# Patient Record
Sex: FEMALE | Race: WHITE | Employment: FULL TIME | RURAL
[De-identification: names, ages, dates, MRNs, and addresses within clinical notes are randomized per-mention and may not be internally consistent; named-entity substitution may affect disease eponyms.]

---

## 2017-02-20 ENCOUNTER — OFFICE VISIT (OUTPATIENT)
Dept: FAMILY MEDICINE CLINIC | Age: 47
End: 2017-02-20

## 2017-02-20 VITALS
HEART RATE: 77 BPM | TEMPERATURE: 97.7 F | HEIGHT: 69 IN | WEIGHT: 174.8 LBS | BODY MASS INDEX: 25.89 KG/M2 | RESPIRATION RATE: 18 BRPM | SYSTOLIC BLOOD PRESSURE: 113 MMHG | OXYGEN SATURATION: 99 % | DIASTOLIC BLOOD PRESSURE: 72 MMHG

## 2017-02-20 DIAGNOSIS — J02.0 STREP THROAT: Primary | ICD-10-CM

## 2017-02-20 DIAGNOSIS — R05.9 COUGH: ICD-10-CM

## 2017-02-20 DIAGNOSIS — R52 BODY ACHES: ICD-10-CM

## 2017-02-20 DIAGNOSIS — Z13.31 DEPRESSION SCREENING: ICD-10-CM

## 2017-02-20 DIAGNOSIS — J02.9 SORE THROAT: ICD-10-CM

## 2017-02-20 LAB
QUICKVUE INFLUENZA TEST: NEGATIVE
S PYO AG THROAT QL: POSITIVE
VALID INTERNAL CONTROL?: YES
VALID INTERNAL CONTROL?: YES

## 2017-02-20 RX ORDER — AZITHROMYCIN 250 MG/1
TABLET, FILM COATED ORAL
Qty: 6 TAB | Refills: 0 | Status: SHIPPED | OUTPATIENT
Start: 2017-02-20 | End: 2018-10-09 | Stop reason: ALTCHOICE

## 2017-02-20 NOTE — MR AVS SNAPSHOT
Visit Information Date & Time Provider Department Dept. Phone Encounter #  
 2/20/2017  1:00 PM Titus Wong PA-C [address] [id number] Follow-up Instructions Return if symptoms worsen or fail to improve. Upcoming Health Maintenance Date Due Pneumococcal 19-64 Medium Risk (1 of 1 - PPSV23) 4/4/1988 DTaP/Tdap/Td series (1 - Tdap) 4/4/1990 PAP AKA CERVICAL CYTOLOGY 4/4/1990 INFLUENZA AGE 9 TO ADULT 8/1/2016 Allergies as of 2/20/2017  Review Complete On: 2/20/2017 By: Titus Wong PA-C Severity Noted Reaction Type Reactions Pollo  05/05/2016    Hives Pcn [Penicillins]  05/22/2013    Unknown (comments) Rocephin [Ceftriaxone]  05/22/2013   Systemic Rash Current Immunizations  Never Reviewed No immunizations on file. Not reviewed this visit You Were Diagnosed With   
  
 Codes Comments Strep throat    -  Primary ICD-10-CM: J02.0 ICD-9-CM: 034.0 Sore throat     ICD-10-CM: J02.9 ICD-9-CM: 773 Cough     ICD-10-CM: R05 ICD-9-CM: 786.2 Body aches     ICD-10-CM: R52 ICD-9-CM: 780.96 Depression screening     ICD-10-CM: Z13.89 ICD-9-CM: V79.0 Vitals BP Pulse Temp Resp Height(growth percentile) Weight(growth percentile) 113/72 (BP 1 Location: Right arm, BP Patient Position: Sitting) 77 97.7 °F (36.5 °C) (Temporal) 18 5' 8.5\" (1.74 m) 174 lb 12.8 oz (79.3 kg) LMP SpO2 BMI OB Status Smoking Status 02/13/2017 (Approximate) 99% 26.19 kg/m2 Having regular periods Never Smoker Vitals History BMI and BSA Data Body Mass Index Body Surface Area  
 26.19 kg/m 2 1.96 m 2 Preferred Pharmacy Pharmacy Name Phone [facility] [address] [place], [address] Your Updated Medication List  
  
   
This list is accurate as of: 2/20/17  2:09 PM.  Always use your most recent med list.  
  
  
  
  
 azithromycin 250 mg tablet Commonly known as:  [place] Take 2 tablets today, then take 1 tablet daily DIMETAPP 12-HOUR PO Take  by mouth.  
  
 mupirocin calcium 2 % nasal ointment Commonly known as:  BACTROBAN NASAL  
by Both Nostrils route two (2) times a day. Prescriptions Sent to Pharmacy Refills  
 azithromycin (ZITHROMAX) 250 mg tablet 0 Sig: Take 2 tablets today, then take 1 tablet daily Class: Normal  
 Pharmacy: [facility] [address] [place] [address] [address]  #: [phone] We Performed the Following AMB POC RAPID INFLUENZA TEST [20678 CPT(R)] AMB POC RAPID STREP A [95714 CPT(R)] BEHAV ASSMT W/SCORE & DOCD/STAND INSTRUMENT [id number] CPT(R)] Follow-up Instructions Return if symptoms worsen or fail to improve. Patient Instructions Cough: Care Instructions Your Care Instructions A cough is your body's response to something that bothers your throat or airways. Many things can cause a cough. You might cough because of a cold or the flu, bronchitis, or asthma. Smoking, postnasal drip, allergies, and stomach acid that backs up into your throat also can cause coughs. A cough is a symptom, not a disease. Most coughs stop when the cause, such as a cold, goes away. You can take a few steps at home to cough less and feel better. Follow-up care is a key part of your treatment and safety. Be sure to make and go to all appointments, and call your doctor if you are having problems. It's also a good idea to know your test results and keep a list of the medicines you take. How can you care for yourself at home? · Drink lots of water and other fluids. This helps thin the mucus and soothes a dry or sore throat. Honey or lemon juice in hot water or tea may ease a dry cough. · Take cough medicine as directed by your doctor. · Prop up your head on pillows to help you breathe and ease a dry cough. · Try cough drops to soothe a dry or sore throat. Cough drops don't stop a cough. Medicine-flavored cough drops are no better than candy-flavored drops or hard candy. · Do not smoke. Avoid secondhand smoke. If you need help quitting, talk to your doctor about stop-smoking programs and medicines. These can increase your chances of quitting for good. When should you call for help? Call 911 anytime you think you may need emergency care. For example, call if: 
· You have severe trouble breathing. Call your doctor now or seek immediate medical care if: 
· You cough up blood. · You have new or worse trouble breathing. · You have a new or higher fever. · You have a new rash. Watch closely for changes in your health, and be sure to contact your doctor if: 
· You cough more deeply or more often, especially if you notice more mucus or a change in the color of your mucus. · You have new symptoms, such as a sore throat, an earache, or sinus pain. · You do not get better as expected. Where can you learn more? Go to http://tawannaOn The Net Yetvaleria.info/. Enter D279 in the search box to learn more about \"Cough: Care Instructions. \" Current as of: May 27, 2016 Content Version: 11.1 © 0539-9325 [employer]. Care instructions adapted under license by [employer] (which disclaims liability or warranty for this information). If you have questions about a medical condition or this instruction, always ask your healthcare professional. [place] [address] any warranty or liability for your use of this information. Strep Throat: Care Instructions Your Care Instructions Strep throat is a bacterial infection that causes sudden, severe sore throat and fever. Strep throat, which is caused by bacteria called streptococcus, is treated with antibiotics. Sometimes a strep test is necessary to tell if the sore throat is caused by strep bacteria. Treatment can help ease symptoms and may prevent future problems. Follow-up care is a key part of your treatment and safety. Be sure to make and go to all appointments, and call your doctor if you are having problems. It's also a good idea to know your test results and keep a list of the medicines you take. How can you care for yourself at home? · Take your antibiotics as directed. Do not stop taking them just because you feel better. You need to take the full course of antibiotics. · Strep throat can spread to others until 24 hours after you begin taking antibiotics. During this time, you should avoid contact with other people at work or home, especially infants and children. Do not sneeze or cough on others, and wash your hands often. Keep your drinking glass and eating utensils separate from those of others, and wash these items well in hot, soapy water. · Gargle with warm salt water at least once each hour to help reduce swelling and make your throat feel better. Use 1 teaspoon of salt mixed in 8 fluid ounces of warm water. · Take an over-the-counter pain medication, such as acetaminophen (Tylenol), ibuprofen (Advil, Motrin), or naproxen (Aleve). Read and follow all instructions on the label. · Try an over-the-counter anesthetic throat spray or throat lozenges, which may help relieve throat pain. · Drink plenty of fluids. Fluids may help soothe an irritated throat. Hot fluids, such as tea or soup, may help your throat feel better. · Eat soft solids and drink plenty of clear liquids. Flavored ice pops, ice cream, scrambled eggs, sherbet, and gelatin dessert (such as Jell-O) may also soothe the throat. · Get lots of rest. 
· Do not smoke, and avoid secondhand smoke. If you need help quitting, talk to your doctor about stop-smoking programs and medicines. These can increase your chances of quitting for good.  
· Use a vaporizer or humidifier to add moisture to the air in your bedroom. Follow the directions for cleaning the machine. When should you call for help? Call your doctor now or seek immediate medical care if: 
· You have a new or higher fever. · You have a fever with a stiff neck or severe headache. · You have new or worse trouble swallowing. · Your sore throat gets much worse on one side. · Your pain becomes much worse on one side of your throat. Watch closely for changes in your health, and be sure to contact your doctor if: 
· You are not getting better after 2 days (48 hours). · You do not get better as expected. Where can you learn more? Go to http://tawanna-valeria.info/. Enter K625 in the search box to learn more about \"Strep Throat: Care Instructions. \" Current as of: July 29, 2016 Content Version: 11.1 © 5031-6968 [employer]. Care instructions adapted under license by [employer] (which disclaims liability or warranty for this information). If you have questions about a medical condition or this instruction, always ask your healthcare professional. [address] any warranty or liability for your use of this information. Introducing [facility]! Pj Santos introduces [employer] patient portal. Now you can access parts of your medical record, email your doctor's office, and request medication refills online. 1. In your internet browser, go to [email or web address] 2. Click on the First Time User? Click Here link in the Sign In box. You will see the New Member Sign Up page. 3. Enter your [employer] Access Code exactly as it appears below. You will not need to use this code after youve completed the sign-up process. If you do not sign up before the expiration date, you must request a new code. · [employer] Access Code: [email or web address] Expires: 5/21/2017  2:09 PM 
 
4.  Enter the last four digits of your Social Security Number (xxxx) and Date of Birth (mm/dd/yyyy) as indicated and click Submit. You will be taken to the next sign-up page. 5. Create a ZeaKal ID. This will be your ZeaKal login ID and cannot be changed, so think of one that is secure and easy to remember. 6. Create a ZeaKal password. You can change your password at any time. 7. Enter your Password Reset Question and Answer. This can be used at a later time if you forget your password. 8. Enter your e-mail address. You will receive e-mail notification when new information is available in [address] 9. Click Sign Up. You can now view and download portions of your medical record. 10. Click the Download Summary menu link to download a portable copy of your medical information. If you have questions, please visit the Frequently Asked Questions section of the ZeaKal website. Remember, [employer]aKal is NOT to be used for urgent needs. For medical emergencies, dial 911. Now available from your iPhone and Android! Please provide this summary of care documentation to your next provider. Your primary care clinician is listed as Cris Blackburn. If you have any questions after today's visit, please call [phone].

## 2017-02-20 NOTE — PATIENT INSTRUCTIONS
Cough: Care Instructions  Your Care Instructions  A cough is your body's response to something that bothers your throat or airways. Many things can cause a cough. You might cough because of a cold or the flu, bronchitis, or asthma. Smoking, postnasal drip, allergies, and stomach acid that backs up into your throat also can cause coughs. A cough is a symptom, not a disease. Most coughs stop when the cause, such as a cold, goes away. You can take a few steps at home to cough less and feel better. Follow-up care is a key part of your treatment and safety. Be sure to make and go to all appointments, and call your doctor if you are having problems. It's also a good idea to know your test results and keep a list of the medicines you take. How can you care for yourself at home? · Drink lots of water and other fluids. This helps thin the mucus and soothes a dry or sore throat. Honey or lemon juice in hot water or tea may ease a dry cough. · Take cough medicine as directed by your doctor. · Prop up your head on pillows to help you breathe and ease a dry cough. · Try cough drops to soothe a dry or sore throat. Cough drops don't stop a cough. Medicine-flavored cough drops are no better than candy-flavored drops or hard candy. · Do not smoke. Avoid secondhand smoke. If you need help quitting, talk to your doctor about stop-smoking programs and medicines. These can increase your chances of quitting for good. When should you call for help? Call 911 anytime you think you may need emergency care. For example, call if:  · You have severe trouble breathing. Call your doctor now or seek immediate medical care if:  · You cough up blood. · You have new or worse trouble breathing. · You have a new or higher fever. · You have a new rash.   Watch closely for changes in your health, and be sure to contact your doctor if:  · You cough more deeply or more often, especially if you notice more mucus or a change in the color of your mucus. · You have new symptoms, such as a sore throat, an earache, or sinus pain. · You do not get better as expected. Where can you learn more? Go to http://tawanna-valeria.info/. Enter D279 in the search box to learn more about \"Cough: Care Instructions. \"  Current as of: May 27, 2016  Content Version: 11.1  © 2006-2016 [employer]. Care instructions adapted under license by Cole Martin (which disclaims liability or warranty for this information). If you have questions about a medical condition or this instruction, always ask your healthcare professional. [place] [address] any warranty or liability for your use of this information. Strep Throat: Care Instructions  Your Care Instructions    Strep throat is a bacterial infection that causes sudden, severe sore throat and fever. Strep throat, which is caused by bacteria called streptococcus, is treated with antibiotics. Sometimes a strep test is necessary to tell if the sore throat is caused by strep bacteria. Treatment can help ease symptoms and may prevent future problems. Follow-up care is a key part of your treatment and safety. Be sure to make and go to all appointments, and call your doctor if you are having problems. It's also a good idea to know your test results and keep a list of the medicines you take. How can you care for yourself at home? · Take your antibiotics as directed. Do not stop taking them just because you feel better. You need to take the full course of antibiotics. · Strep throat can spread to others until 24 hours after you begin taking antibiotics. During this time, you should avoid contact with other people at work or home, especially infants and children. Do not sneeze or cough on others, and wash your hands often. Keep your drinking glass and eating utensils separate from those of others, and wash these items well in hot, soapy water.   · Gargle with warm salt water at least once each hour to help reduce swelling and make your throat feel better. Use 1 teaspoon of salt mixed in 8 fluid ounces of warm water. · Take an over-the-counter pain medication, such as acetaminophen (Tylenol), ibuprofen (Advil, Motrin), or naproxen (Aleve). Read and follow all instructions on the label. · Try an over-the-counter anesthetic throat spray or throat lozenges, which may help relieve throat pain. · Drink plenty of fluids. Fluids may help soothe an irritated throat. Hot fluids, such as tea or soup, may help your throat feel better. · Eat soft solids and drink plenty of clear liquids. Flavored ice pops, ice cream, scrambled eggs, sherbet, and gelatin dessert (such as Jell-O) may also soothe the throat. · Get lots of rest.  · Do not smoke, and avoid secondhand smoke. If you need help quitting, talk to your doctor about stop-smoking programs and medicines. These can increase your chances of quitting for good. · Use a vaporizer or humidifier to add moisture to the air in your bedroom. Follow the directions for cleaning the machine. When should you call for help? Call your doctor now or seek immediate medical care if:  · You have a new or higher fever. · You have a fever with a stiff neck or severe headache. · You have new or worse trouble swallowing. · Your sore throat gets much worse on one side. · Your pain becomes much worse on one side of your throat. Watch closely for changes in your health, and be sure to contact your doctor if:  · You are not getting better after 2 days (48 hours). · You do not get better as expected. Where can you learn more? Go to http://tawanna-valeria.info/. Enter K625 in the search box to learn more about \"Strep Throat: Care Instructions. \"  Current as of: July 29, 2016  Content Version: 11.1  © 9473-9919 [employer].  Care instructions adapted under license by [employer] (which disclaims liability or warranty for this information). If you have questions about a medical condition or this instruction, always ask your healthcare professional. [place] [address] any warranty or liability for your use of this information.

## 2017-02-20 NOTE — PROGRESS NOTES
Gregg Brewer is a 51 y.o. female who presents to the office today with the following:  Chief Complaint   Patient presents with    Sore Throat     sore throat, headache, fever, cough, chills, bodyaches started last wednesday       HPI  Wed night began with dry cough, no ST, \"just tickle\"  Hoarse next morn. Subjective fever initially, highest taken 101.0F  Thur night fever felt worse, bad chills. Deep cough, still dry. Then dev sore throat, mild, no painful/difficulty swallowing. Sneezing also since yesterday. Runny nose, ears feel itchy/hurting, headache. 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 Review of Systems   Constitutional: Positive for chills, fever and malaise/fatigue. HENT: Positive for congestion, ear pain and sore throat. Respiratory: Positive for cough. Negative for shortness of breath and wheezing. Cardiovascular: Chest pain: pt notes constant soreness from coughing x 3 days, hurts to cough, no exertional sxs. Gastrointestinal: Negative for abdominal pain, diarrhea, nausea and vomiting. Genitourinary: Negative. Musculoskeletal: Positive for myalgias. Skin: Negative for rash. Neurological: Positive for headaches. Allergies   Allergen Reactions    Pollo Hives    Pcn [Penicillins] Unknown (comments)    Rocephin [Ceftriaxone] Rash       Current Outpatient Prescriptions   Medication Sig    azithromycin (ZITHROMAX) 250 mg tablet Take 2 tablets today, then take 1 tablet daily    mupirocin calcium (BACTROBAN NASAL) 2 % nasal ointment by Both Nostrils route two (2) times a day.  PSEUDOEPHEDRINE HCL (DIMETAPP 12-HOUR PO) Take  by mouth. No current facility-administered medications for this visit. Past Medical History   Diagnosis Date    Asthma     MRSA (methicillin resistant Staphylococcus aureus)        History reviewed. No pertinent past surgical history.     Social History     Social History    Marital status:      Spouse name: N/A    Number of children: N/A    Years of education: N/A     Social History Main Topics    Smoking status: Never Smoker    Smokeless tobacco: Never Used    Alcohol use No    Drug use: No    Sexual activity: Yes     Partners: Male     Birth control/ protection: None     Other Topics Concern    None     Social History Narrative       Family History   Problem Relation Age of Onset    Diabetes Paternal Grandfather          Physical Exam:  Visit Vitals    /72 (BP 1 Location: Right arm, BP Patient Position: Sitting)    Pulse 77    Temp 97.7 °F (36.5 °C) (Temporal)    Resp 18    Ht 5' 8.5\" (1.74 m)    Wt 174 lb 12.8 oz (79.3 kg)    LMP 02/13/2017 (Approximate)    SpO2 99%    BMI 26.19 kg/m2     Physical Exam   Constitutional: She is oriented to person, place, and time and well-developed, well-nourished, and in no distress. HENT:   Head: Normocephalic and atraumatic. Right Ear: Tympanic membrane, external ear and ear canal normal.   Left Ear: Tympanic membrane, external ear and ear canal normal.   Nose: Mucosal edema (mild, nares patent) present. Right sinus exhibits no maxillary sinus tenderness and no frontal sinus tenderness. Left sinus exhibits no maxillary sinus tenderness and no frontal sinus tenderness. Mouth/Throat: Uvula is midline and mucous membranes are normal. Posterior oropharyngeal erythema (mild) present. No oropharyngeal exudate, posterior oropharyngeal edema or tonsillar abscesses. Eyes: Conjunctivae are normal.   Neck: Normal range of motion. Neck supple. Cardiovascular: Normal rate, regular rhythm, normal heart sounds and intact distal pulses. Pulmonary/Chest: Effort normal and breath sounds normal. No respiratory distress. She has no wheezes. She has no rales. Abdominal: Soft. There is no tenderness. Lymphadenopathy:     She has no cervical adenopathy. Neurological: She is alert and oriented to person, place, and time. Gait normal.   Skin: Skin is warm and dry. Psychiatric: Mood and affect normal.   Nursing note and vitals reviewed. Assessment/Plan:    ICD-10-CM ICD-9-CM    1. Strep throat J02.0 034.0 azithromycin (ZITHROMAX) 250 mg tablet   2. Sore throat J02.9 462 AMB POC RAPID STREP A      azithromycin (ZITHROMAX) 250 mg tablet      AMB POC RAPID INFLUENZA TEST   3. Cough R05 786.2 AMB POC RAPID INFLUENZA TEST   4. Body aches R52 780.96 AMB POC RAPID INFLUENZA TEST   5. Depression screening Z13.89 V79.0 BEHAV ASSMT W/SCORE & DOCD/STAND INSTRUMENT     Results for orders placed or performed in visit on 02/20/17   AMB POC RAPID STREP A   Result Value Ref Range    VALID INTERNAL CONTROL POC Yes     Group A Strep Ag Positive Negative   AMB POC RAPID INFLUENZA TEST   Result Value Ref Range    VALID INTERNAL CONTROL POC Yes     QuickVue Influenza test Negative Negative     Encourage rest & fluids. Warm salt water gargles. Discussed otc medications for symptomatic relief. New tooth brush. RTO if sxs persist/worsen or develops any additional sxs/concerns. Follow-up Disposition:  Return if symptoms worsen or fail to improve.     Pierce Plunkett PA-C

## 2017-02-20 NOTE — PROGRESS NOTES
Chief Complaint   Patient presents with    Sore Throat     sore throat, headache, fever, cough, chills, bodyaches started last wednesday

## 2017-07-17 ENCOUNTER — TELEPHONE (OUTPATIENT)
Dept: FAMILY MEDICINE CLINIC | Age: 47
End: 2017-07-17

## 2017-07-17 NOTE — TELEPHONE ENCOUNTER
Patient states this message was not supposed to be sent here to [address] [place] [address]. She would like a call back from Dr. Kj Finch. Routing this message to [facility].

## 2017-07-17 NOTE — TELEPHONE ENCOUNTER
----- Message from Jerald Leiva sent at 7/17/2017 10:04 AM EDT -----  Regarding: FW: Dr. Janelle Nathan      ----- Message -----     From: Elenita Veliz     Sent: 7/17/2017   7:48 AM       To: [facility] Front Office  Subject: Dr. Janelle Nathan                             Pt stated she would like a call from the doctor to discuss some personal issues . Best contact number [phone].

## 2017-07-17 NOTE — TELEPHONE ENCOUNTER
Left message on her voice mail, asking her to return call, re: how I could help her (had question for Luis Manuel Isaac, per note in chart).

## 2019-04-15 PROBLEM — L30.9 ECZEMA: Status: ACTIVE | Noted: 2019-04-15

## 2019-04-15 PROBLEM — M13.0 POLYARTICULAR ARTHRITIS: Status: ACTIVE | Noted: 2019-04-15

## 2020-09-15 ENCOUNTER — OFFICE VISIT (OUTPATIENT)
Dept: PRIMARY CARE CLINIC | Age: 50
End: 2020-09-15

## 2020-09-15 DIAGNOSIS — Z20.828 EXPOSURE TO SARS-ASSOCIATED CORONAVIRUS: Primary | ICD-10-CM

## 2020-09-15 NOTE — LETTER
NOTIFICATION RETURN TO WORK / SCHOOL 
 
9/15/2020 8:13 AM 
 
Ms. Joy Ramospool 1970 
[address] [id number] To Whom It May Concern: Anisha Schneider is currently under the care of [facility] and was  
 
tested for Covid 19 on 9/15/2020. It is recommended that the patient self-quarantine until the Covid test  
 
results return AND until they are symptom free without medication for 48 hours. She will return to work/school on: Once test results come back If there are questions or concerns please have the patient contact our office. Sincerely, Mariya Vargas, [facility]

## 2020-09-17 LAB — SARS-COV-2, NAA: NOT DETECTED

## 2021-01-05 ENCOUNTER — OFFICE VISIT (OUTPATIENT)
Dept: PRIMARY CARE CLINIC | Age: 51
End: 2021-01-05

## 2021-01-05 DIAGNOSIS — Z20.822 ENCOUNTER FOR LABORATORY TESTING FOR COVID-19 VIRUS: Primary | ICD-10-CM

## 2021-01-08 LAB — SARS-COV-2, NAA: NOT DETECTED

## 2021-02-05 PROBLEM — M13.0 POLYARTICULAR ARTHRITIS: Status: RESOLVED | Noted: 2019-04-15 | Resolved: 2021-02-05

## 2021-05-24 ENCOUNTER — OFFICE VISIT (OUTPATIENT)
Dept: FAMILY MEDICINE CLINIC | Age: 51
End: 2021-05-24
Payer: COMMERCIAL

## 2021-05-24 VITALS
WEIGHT: 190.6 LBS | DIASTOLIC BLOOD PRESSURE: 70 MMHG | HEIGHT: 68 IN | SYSTOLIC BLOOD PRESSURE: 100 MMHG | TEMPERATURE: 98.1 F | OXYGEN SATURATION: 98 % | BODY MASS INDEX: 28.89 KG/M2 | HEART RATE: 91 BPM

## 2021-05-24 DIAGNOSIS — F41.9 ANXIETY: ICD-10-CM

## 2021-05-24 DIAGNOSIS — M62.830 BACK SPASM: Primary | ICD-10-CM

## 2021-05-24 DIAGNOSIS — B35.3 TINEA PEDIS OF BOTH FEET: ICD-10-CM

## 2021-05-24 PROCEDURE — 99213 OFFICE O/P EST LOW 20 MIN: CPT | Performed by: FAMILY MEDICINE

## 2021-05-24 RX ORDER — KETOCONAZOLE 20 MG/G
CREAM TOPICAL DAILY
Qty: 60 G | Refills: 2 | Status: SHIPPED | OUTPATIENT
Start: 2021-05-24 | End: 2022-06-03

## 2021-05-24 RX ORDER — PREDNISONE 10 MG/1
10 TABLET ORAL
Qty: 4 TABLET | Refills: 0 | Status: SHIPPED | OUTPATIENT
Start: 2021-05-24 | End: 2021-06-11 | Stop reason: ALTCHOICE

## 2021-05-24 RX ORDER — BUSPIRONE HYDROCHLORIDE 5 MG/1
5 TABLET ORAL
Qty: 30 TABLET | Refills: 11 | Status: SHIPPED | OUTPATIENT
Start: 2021-05-24 | End: 2022-06-03

## 2021-05-24 NOTE — PROGRESS NOTES
Chief Complaint   Patient presents with    Back Pain     1. Have you been to the ER, urgent care clinic since your last visit? Hospitalized since your last visit? No    2. Have you seen or consulted any other health care providers outside of the [address] [place] [address] since your last visit? Include any pap smears or colon screening. Eve Ng is a 45 y.o. female    HPI:  Symptoms include mid back pain x2mo, waxing and waning. No trauma or known overuse, although she is a . gradually worsening since that time. Evaluation to date: none. Treatment to date: rest, better with lying flat, better with sitting with one or the other hip up, better sitting with back arched, or lying flat on back. Worse with bending forward, worse with lifting. PMH, SH, Medications/Allergies: reviewed, on chart. Current Outpatient Medications   Medication Sig    tiZANidine (ZANAFLEX) 4 mg tablet 1 BID prn back spasm    betamethasone dipropionate (DIPROLENE) 0.05 % ointment APPLY SPARINGLY WITH GLOVES TO CRACKED, FISSURED HEAVY PLAQUES TWICE DAILY AS NEEDED    amitriptyline (ELAVIL) 50 mg tablet 1 tab qhs for fibromyalgia    triamcinolone acetonide (KENALOG) 0.1 % topical cream AAA to mild itchy patches and plaques BID in thin coat     No current facility-administered medications for this visit.       ROS:  Constitutional: No fever, chills or abnormal weight loss  Respiratory: No cough, SOB   CV: No chest pain or Palpitations    Visit Vitals  /70 (BP 1 Location: Right upper arm, BP Patient Position: Sitting, BP Cuff Size: Adult)   Pulse 91   Temp 98.1 °F (36.7 °C) (Oral)   Ht 5' 8\" (1.727 m)   Wt 190 lb 9.6 oz (86.5 kg)   SpO2 98%   BMI 28.98 kg/m²     Wt Readings from Last 3 Encounters:   05/24/21 190 lb 9.6 oz (86.5 kg)   07/03/19 170 lb (77.1 kg)   05/13/19 175 lb (79.4 kg)     BP Readings from Last 3 Encounters:   05/24/21 100/70   07/03/19 110/60   05/13/19 100/60     Physical Examination: General appearance - alert, well appearing, and in no distress  Mental status - alert, oriented to person, place, and time  Eyes - pupils equal and reactive, extraocular eye movements intact  ENT - bilateral external ears and nose normal. Normal lips  Neck - supple, no significant adenopathy, no thyromegaly or mass  Lymphatics - no palpable lymphadenopathy, no hepatosplenomegaly  Chest - clear to auscultation, no wheezes, rales or rhonchi, symmetric air entry  Heart - normal rate, regular rhythm, normal S1, S2, no murmurs, rubs, clicks or gallops  Extremities - peripheral pulses normal, no pedal edema, no clubbing or cyanosis  Musculoskeletal:  Spine:  Cervical   Thoracic   Lumbar: straight. TTP to paraspinous mm. near T5-t8 Bilat. LE strength is normal. Dionisio's signs absent. A/P  Mid back strain  Did well in past with prednisone, not getting a lot of help from OTC NSAIDs. RF prednisone 10mg, 1 every day x4d    Anxiety  Mildly worsening lately. Discussed options. Consider therapy referral. PT would like to try low dose buspar first (discussed drug int with elavil). Start 5mg/d, titrate as needed. Athlete's foot  Incidentally noted.  tx with nizoral cr AAA daily x 10d    F/U PRN

## 2021-06-11 ENCOUNTER — OFFICE VISIT (OUTPATIENT)
Dept: FAMILY MEDICINE CLINIC | Age: 51
End: 2021-06-11
Payer: COMMERCIAL

## 2021-06-11 VITALS
RESPIRATION RATE: 18 BRPM | TEMPERATURE: 97.2 F | OXYGEN SATURATION: 97 % | HEART RATE: 92 BPM | DIASTOLIC BLOOD PRESSURE: 82 MMHG | WEIGHT: 191.4 LBS | SYSTOLIC BLOOD PRESSURE: 116 MMHG | HEIGHT: 68 IN | BODY MASS INDEX: 29.01 KG/M2

## 2021-06-11 DIAGNOSIS — Z23 ENCOUNTER FOR IMMUNIZATION: Primary | ICD-10-CM

## 2021-06-11 DIAGNOSIS — L03.011 PARONYCHIA OF RIGHT THUMB: ICD-10-CM

## 2021-06-11 PROCEDURE — 90471 IMMUNIZATION ADMIN: CPT | Performed by: INTERNAL MEDICINE

## 2021-06-11 PROCEDURE — 10060 I&D ABSCESS SIMPLE/SINGLE: CPT | Performed by: INTERNAL MEDICINE

## 2021-06-11 PROCEDURE — 99214 OFFICE O/P EST MOD 30 MIN: CPT | Performed by: INTERNAL MEDICINE

## 2021-06-11 PROCEDURE — 90715 TDAP VACCINE 7 YRS/> IM: CPT | Performed by: INTERNAL MEDICINE

## 2021-06-11 RX ORDER — CLINDAMYCIN HYDROCHLORIDE 300 MG/1
300 CAPSULE ORAL 3 TIMES DAILY
Qty: 30 CAPSULE | Refills: 0 | Status: SHIPPED | OUTPATIENT
Start: 2021-06-11 | End: 2021-06-21

## 2021-06-11 NOTE — PROGRESS NOTES
Brandon Rodas is a 45 y.o. female presenting for/with:    Chief Complaint   Patient presents with    Finger Swelling     C/O possible thorn to (R) thumb since Sunday (+) pain. (+) redness (+) swelling    Back Pain     Completed prednisone without relief of back pain       Visit Vitals  /82 (BP 1 Location: Left upper arm, BP Patient Position: Sitting, BP Cuff Size: Adult long)   Pulse 92   Temp 97.2 °F (36.2 °C) (Temporal)   Resp 18   Ht 5' 8\" (1.727 m)   Wt 191 lb 6.4 oz (86.8 kg)   SpO2 97%   BMI 29.10 kg/m²     Pain Scale: 8/10  Pain Location: Arm ((R) hand and back)    1. Have you been to the ER, urgent care clinic since your last visit? Hospitalized since your last visit? NO    2. Have you seen or consulted any other health care providers outside of the [address] [place] [address] since your last visit? Include any pap smears or colon screening. NO    Symptom review:  NO  Fever   NO  Shaking chills  NO  Cough  NO  Body aches  NO  Coughing up blood  NO  Chest congestion  NO  Chest pain  NO  Shortness of breath  NO  Profound Loss of smell/taste  NO  Nausea/Vomiting   NO  Loose stool/Diarrhea  YES  any skin issues    Patient Risk Factors Reviewed as follows:  NO  have you been in Close contact with confirmed COVID19 patient   NO  History of recent travel to affected geographical areas within the past 14 days  NO  COPD  NO  Active Cancer/Leukemia/Lymphoma/Chemotherapy  NO  Oral steroid use  NO  Pregnant  NO  Diabetes Mellitus  NO  Heart disease  NO  Asthma  NO Health care worker at home  [address] care worker  NO Is there a Pregnant Woman in the home  NO Dialysis pt in the home   NO a large number of people living in the home    Learning Assessment 5/24/2021   PRIMARY LEARNER Patient   HIGHEST LEVEL OF EDUCATION - PRIMARY LEARNER  -   PRIMARY LANGUAGE ENGLISH   LEARNER PREFERENCE PRIMARY READING   ANSWERED BY patient    RELATIONSHIP SELF     Fall Risk Assessment, last 12 mths 5/24/2021   Able to walk? Yes   Fall in past 12 months? 0   Do you feel unsteady? 0   Are you worried about falling 0       3 most recent PHQ Screens 5/24/2021   Little interest or pleasure in doing things Not at all   Feeling down, depressed, irritable, or hopeless Not at all   Total Score PHQ 2 0     Abuse Screening Questionnaire 5/24/2021   Do you ever feel afraid of your partner? N   Are you in a relationship with someone who physically or mentally threatens you? N   Is it safe for you to go home? Y       ADL Assessment 5/24/2021   Feeding yourself No Help Needed   Getting from bed to chair No Help Needed   Getting dressed No Help Needed   Bathing or showering No Help Needed   Walk across the room (includes cane/walker) No Help Needed   Using the telphone No Help Needed   Taking your medications No Help Needed   Preparing meals No Help Needed   Managing money (expenses/bills) No Help Needed   Moderately strenuous housework (laundry) No Help Needed   Shopping for personal items (toiletries/medicines) No Help Needed   Shopping for groceries No Help Needed   Driving No Help Needed   Climbing a flight of stairs No Help Needed   Getting to places beyond walking distances No Help Needed      After obtaining consent, and per orders of Dr. Beatriz Hadley, injection of TDAP to (R) deltoid given by Nuha Justice. Patient instructed to remain in clinic for 20 minutes afterwards, and to report any adverse reaction to me immediately.

## 2021-06-11 NOTE — PATIENT INSTRUCTIONS
Vaccine Information Statement Tdap (Tetanus, Diphtheria, Pertussis) Vaccine: What you need to know Many Vaccine Information Statements are available in Maltese and other languages. See www.immunize.org/vis Hojas de información sobre vacunas están disponibles en español y en muchos otros idiomas. Visite www.immunize.org/vis 1. Why get vaccinated? Tdap vaccine can prevent tetanus, diphtheria, and pertussis. Diphtheria and pertussis spread from person to person. Tetanus enters the body through cuts or wounds.  TETANUS (T) causes painful stiffening of the muscles. Tetanus can lead to serious health problems, including being unable to open the mouth, having trouble swallowing and breathing, or death.  DIPHTHERIA (D) can lead to difficulty breathing, heart failure, paralysis, or death.  PERTUSSIS (aP), also known as whooping cough, can cause uncontrollable, violent coughing which makes it hard to breathe, eat, or drink. Pertussis can be extremely serious in babies and young children, causing pneumonia, convulsions, brain damage, or death. In teens and adults, it can cause weight loss, loss of bladder control, passing out, and rib fractures from severe coughing. 2. Tdap vaccine Tdap is only for children 7 years and older, adolescents, and adults. Adolescents should receive a single dose of Tdap, preferably at age 6 or 15 years. Pregnant women should get a dose of Tdap during every pregnancy, to protect the  from pertussis. Infants are most at risk for severe, life-threatening complications from pertussis. Adults who have never received Tdap should get a dose of Tdap. Also, adults should receive a booster dose every 10 years, or earlier in the case of a severe and dirty wound or burn. Booster doses can be either Tdap or Td (a different vaccine that protects against tetanus and diphtheria but not pertussis).   
 
Tdap may be given at the same time as other vaccines. 3. Talk with your health care provider Tell your vaccine provider if the person getting the vaccine: 
 Has had an allergic reaction after a previous dose of any vaccine that protects against tetanus, diphtheria, or pertussis, or has any severe, life-threatening allergies.  Has had a coma, decreased level of consciousness, or prolonged seizures within 7 days after a previous dose of any pertussis vaccine (DTP, DTaP, or Tdap).  Has seizures or another nervous system problem.  Has ever had Guillain-Barré Syndrome (also called GBS).  Has had severe pain or swelling after a previous dose of any vaccine that protects against tetanus or diphtheria. In some cases, your health care provider may decide to postpone Tdap vaccination to a future visit. People with minor illnesses, such as a cold, may be vaccinated. People who are moderately or severely ill should usually wait until they recover before getting Tdap vaccine. Your health care provider can give you more information. 4. Risks of a vaccine reaction  Pain, redness, or swelling where the shot was given, mild fever, headache, feeling tired, and nausea, vomiting, diarrhea, or stomachache sometimes happen after Tdap vaccine. People sometimes faint after medical procedures, including vaccination. Tell your provider if you feel dizzy or have vision changes or ringing in the ears. As with any medicine, there is a very remote chance of a vaccine causing a severe allergic reaction, other serious injury, or death. 5. What if there is a serious problem? An allergic reaction could occur after the vaccinated person leaves the clinic. If you see signs of a severe allergic reaction (hives, swelling of the face and throat, difficulty breathing, a fast heartbeat, dizziness, or weakness), call 9-1-1 and get the person to the nearest hospital. 
 
For other signs that concern you, call your health care provider.  
 
Adverse reactions should be reported to the Vaccine Adverse Event Reporting System (VAERS). Your health care provider will usually file this report, or you can do it yourself. Visit the VAERS website at www.vaers. [facility].gov or call 2-476.175.7130. VAERS is only for reporting reactions, and VAERS staff do not give medical advice. 6. The National Vaccine Injury Compensation Program 
 
The [facility] Vaccine Injury Compensation Program (VICP) is a federal program that was created to compensate people who may have been injured by certain vaccines. Visit the VICP website at www.[facility].gov/vaccinecompensation or call 3-691.167.4886 to learn about the program and about filing a claim. There is a time limit to file a claim for compensation. 7. How can I learn more?  Ask your health care provider.  Call your local or state health department.  Contact the Centers for Disease Control and Prevention (CDC): 
- Call 6-289.856.7599 (1-800-CDC-INFO) or 
- Visit CDCs website at www.cdc.gov/vaccines Vaccine Information Statement (Interim) Tdap (Tetanus, Diphtheria, Pertussis) Vaccine 04/01/2020 
42 SHAAN Murdock [id number] Department of Health and [employer] Centers for Disease Control and Prevention Office Use Only

## 2021-06-11 NOTE — PROGRESS NOTES
Chief Complaint   Patient presents with    Finger Swelling     C/O possible thorn to (R) thumb since Sunday (+) pain. (+) redness (+) swelling    Back Pain     Completed prednisone without relief of back pain       ASSESSMENT AND PLAN:    1. Encounter for immunization  - TETANUS, DIPHTHERIA TOXOIDS AND ACELLULAR PERTUSSIS VACCINE (TDAP), IN INDIVIDS. >=7, IM    2. Paronychia of right thumb  - clindamycin (CLEOCIN) 300 mg capsule; Take 1 Capsule by mouth three (3) times daily for 10 days. Dispense: 30 Capsule; Refill: 0       Warm salt water soaks TID. Elevate       [employer] message Monday for update. She will call me if there are concerns. Orders Placed This Encounter    Tetanus, diphtheria toxoids and acellular pertussis (TDAP) vaccine, in individuals >=7 years, IM    clindamycin (CLEOCIN) 300 mg capsule     Sig: Take 1 Capsule by mouth three (3) times daily for 10 days. Dispense:  30 Capsule     Refill:  0       Monalisa Lew MD, FACP      HPI:        Jese Sanders is a 45 y.o. female who notes the onset of a skin problem. The details are as follows:  Painful tight thumb.  vvery tender. No fever. Unsure of Td status. Allergy to PCN and Rocephin. Allergies   Allergen Reactions    Pollo Hives    Pcn [Penicillins] Unknown (comments)    Rocephin [Ceftriaxone] Rash       Current Outpatient Medications   Medication Sig    clindamycin (CLEOCIN) 300 mg capsule Take 1 Capsule by mouth three (3) times daily for 10 days.  busPIRone (BUSPAR) 5 mg tablet Take 1 Tablet by mouth daily as needed (anxiety).  ketoconazole (NIZORAL) 2 % topical cream Apply  to affected area daily.     tiZANidine (ZANAFLEX) 4 mg tablet 1 BID prn back spasm    betamethasone dipropionate (DIPROLENE) 0.05 % ointment APPLY SPARINGLY WITH GLOVES TO CRACKED, FISSURED HEAVY PLAQUES TWICE DAILY AS NEEDED    amitriptyline (ELAVIL) 50 mg tablet 1 tab qhs for fibromyalgia    triamcinolone acetonide (KENALOG) 0.1 % topical cream AAA to mild itchy patches and plaques BID in thin coat     No current facility-administered medications for this visit. Past Medical History:   Diagnosis Date    Asthma     Menopause     MRSA (methicillin resistant Staphylococcus aureus)          ROS:  Denies fever, chills, cough, chest pain, SOB,  nausea, vomiting, or diarrhea. Denies wt loss, wt gain, hemoptysis, hematochezia or melena. Physical Examination:    Visit Vitals  /82 (BP 1 Location: Left upper arm, BP Patient Position: Sitting, BP Cuff Size: Adult long)   Pulse 92   Temp 97.2 °F (36.2 °C) (Temporal)   Resp 18   Ht 5' 8\" (1.727 m)   Wt 191 lb 6.4 oz (86.8 kg)   SpO2 97%   BMI 29.10 kg/m²      General:  Alert, cooperative, no distress. Head:  Normocephalic, without obvious abnormality, atraumatic. Eyes:  Conjunctivae/corneas clear. Pupils equal, round, reactive to light. Chest wall:  No tenderness or deformity. Extremities: Extremities normal, atraumatic, no cyanosis or edema. Skin:  paronychion right thumb   Lymph nodes: Cervical and supraclavicular nodes are normal.   Neurologic: Moves all extremities, fluent speech     Procedure note: With the patient's verbal consent following an appraisal of the risks, benefits and alternatives to I&D, the right thumb was prepped with alcohol and the paronychia was entered with a shallow stab incision with a #11 blade, liberating about 1 cc of brown pus. This wound was cleaned and dressed with a simple band aid.

## 2021-07-23 ENCOUNTER — OFFICE VISIT (OUTPATIENT)
Dept: FAMILY MEDICINE CLINIC | Age: 51
End: 2021-07-23
Payer: COMMERCIAL

## 2021-07-23 ENCOUNTER — HOSPITAL ENCOUNTER (OUTPATIENT)
Dept: GENERAL RADIOLOGY | Age: 51
Discharge: HOME | End: 2021-07-23
Payer: COMMERCIAL

## 2021-07-23 VITALS
WEIGHT: 184.6 LBS | OXYGEN SATURATION: 100 % | TEMPERATURE: 97.3 F | SYSTOLIC BLOOD PRESSURE: 118 MMHG | DIASTOLIC BLOOD PRESSURE: 82 MMHG | HEIGHT: 68 IN | HEART RATE: 91 BPM | RESPIRATION RATE: 18 BRPM | BODY MASS INDEX: 27.98 KG/M2

## 2021-07-23 DIAGNOSIS — G89.29 CHRONIC MIDLINE THORACIC BACK PAIN: ICD-10-CM

## 2021-07-23 DIAGNOSIS — M54.6 CHRONIC MIDLINE THORACIC BACK PAIN: Primary | ICD-10-CM

## 2021-07-23 DIAGNOSIS — M62.830 BACK SPASM: ICD-10-CM

## 2021-07-23 DIAGNOSIS — G89.29 CHRONIC MIDLINE THORACIC BACK PAIN: Primary | ICD-10-CM

## 2021-07-23 DIAGNOSIS — M54.6 CHRONIC MIDLINE THORACIC BACK PAIN: ICD-10-CM

## 2021-07-23 PROCEDURE — 99214 OFFICE O/P EST MOD 30 MIN: CPT | Performed by: INTERNAL MEDICINE

## 2021-07-23 PROCEDURE — 72072 X-RAY EXAM THORAC SPINE 3VWS: CPT

## 2021-07-23 PROCEDURE — 71046 X-RAY EXAM CHEST 2 VIEWS: CPT

## 2021-07-23 RX ORDER — TIZANIDINE 4 MG/1
TABLET ORAL
Qty: 30 TABLET | Refills: 4 | Status: SHIPPED | OUTPATIENT
Start: 2021-07-23 | End: 2021-10-28 | Stop reason: SDUPTHER

## 2021-07-23 NOTE — PROGRESS NOTES
Ms. Tony Winn is a 45 y.o. female who is here for evaluation of   Chief Complaint   Patient presents with    Back Pain     C/O mid back (at bra line) pain. radiaties around bra line to chest. Denies SOB/CP. Denies numbness/ tingling.  Diarrhea     c/o 2.5 days with chills    Headache   . ASSESSMENT AND PLAN:    1. Chronic midline thoracic back pain  She is clearly uncomfortable 7 months out from the initial complaint of midthoracic pain and has followed a conservative approach and seems worse. Needs evaluation:  X rays, labs and refill Tinazidine. Close follow up. - XR CHEST PA LAT; Future  - XR SPINE THORAC 3 V; Future  - CBC WITH AUTOMATED DIFF; Future  - METABOLIC PANEL, COMPREHENSIVE; Future  - SED RATE (ESR); Future  - GAMMOPATHY EVAL, SPEP/SHARATH, IG QT/FLC; Future  - GAMMOPATHY EVAL, SPEP/SHARATH, IG QT/FLC  - SED RATE (ESR)  - METABOLIC PANEL, COMPREHENSIVE  - CBC WITH AUTOMATED DIFF    2. Back spasm  Etiology is not clear. - tiZANidine (ZANAFLEX) 4 mg tablet; 1 BID prn back spasm  Dispense: 30 Tablet; Refill: 4      Orders Placed This Encounter    XR CHEST PA LAT     Standing Status:   Future     Standing Expiration Date:   8/23/2022     Order Specific Question:   Reason for Exam     Answer:   chest pain     Order Specific Question:   Which facility to perform procedure? Answer:   [facility]     Standing Status:   Future     Standing Expiration Date:   8/23/2022     Order Specific Question:   Reason for Exam     Answer:   mid thoracic pain     Order Specific Question:   Which facility to perform procedure?      Answer:   [facility]    CBC WITH AUTOMATED DIFF     Standing Status:   Future     Number of Occurrences:   1     Standing Expiration Date:   2/34/2982    METABOLIC PANEL, COMPREHENSIVE     Standing Status:   Future     Number of Occurrences:   1     Standing Expiration Date:   7/30/2021    SED RATE (ESR)     Standing Status:   Future     Number of Occurrences:   1 Standing Expiration Date:   2021    GAMMOPATHY EVAL, SPEP/SHARATH, IG QT/FLC     Standing Status:   Future     Number of Occurrences:   1     Standing Expiration Date:   2021    tiZANidine (ZANAFLEX) 4 mg tablet     Si BID prn back spasm     Dispense:  30 Tablet     Refill:  4           HPI  44 yo with progressively worsening mid thoracic pain. Tizanidine has helped. has trouble sleeping due to pain. 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 Diarrhea for the past 3 days. Loose, nonbloody. Chills last night. ROS:  Denies fever, cough, chest pain, SOB,  nausea. Denies wt loss, wt gain, hemoptysis, hematochezia or melena. Physical Examination:    Visit Vitals  /82 (BP 1 Location: Left upper arm, BP Patient Position: Sitting, BP Cuff Size: Adult long)   Pulse 91   Temp 97.3 °F (36.3 °C) (Temporal)   Resp 18   Ht 5' 8\" (1.727 m)   Wt 184 lb 9.6 oz (83.7 kg)   SpO2 100%   BMI 28.07 kg/m²      General:  Alert, cooperative, no distress. Head:  Normocephalic, without obvious abnormality, atraumatic. Eyes:  Conjunctivae/corneas clear. Pupils equal, round, reactive to light. Extraocular movements intact. Lungs:   Clear to auscultation bilaterally. Chest wall:  No tenderness or deformity. Tender over T6 midline posteriorly. Cardiac:  RRR   Abdomen:   Soft, non-tender. Bowel sounds normal. No masses. No organomegaly. Extremities: Extremities normal, atraumatic, no cyanosis or edema. Pulses: 2+ and symmetric all extremities. Skin: Skin color, texture, turgor normal. No rashes or lesions. Lymph nodes: Cervical, supraclavicular, and axillary nodes normal.   Neurologic: CNII-XII intact. Normal strength, sensation, and reflexes throughout.      On this date 2021 I have spent 30 minutes reviewing previous notes, test results and face to face with the patient discussing the diagnosis and importance of compliance with the treatment plan as well as documenting on the day of the visit.     Kathi Villalta MD FACP    (signed electronically) on 7/23/2021 at 12:43 PM

## 2021-07-23 NOTE — PROGRESS NOTES
Dorothy Crouch is a 45 y.o. female presenting for/with:    Chief Complaint   Patient presents with    Back Pain     C/O mid back (at bra line) pain. radiaties around bra line to chest. Denies SOB/CP. Denies numbness/ tingling.  Diarrhea     c/o 2.5 days with chills    Headache       Visit Vitals  /82 (BP 1 Location: Left upper arm, BP Patient Position: Sitting, BP Cuff Size: Adult long)   Pulse 91   Temp 97.3 °F (36.3 °C) (Temporal)   Resp 18   Ht 5' 8\" (1.727 m)   Wt 184 lb 9.6 oz (83.7 kg)   SpO2 100%   BMI 28.07 kg/m²     Pain Scale: 7/10  Pain Location: Back    1. Have you been to the ER, urgent care clinic since your last visit? Hospitalized since your last visit? NO    2. Have you seen or consulted any other health care providers outside of the [address] [place] [address] since your last visit? Include any pap smears or colon screening. NO    Symptom review:  NO  Fever   NO  Shaking chills  NO  Cough  NO  Body aches  NO  Coughing up blood  NO  Chest congestion  NO  Chest pain  NO  Shortness of breath  NO  Profound Loss of smell/taste  NO  Nausea/Vomiting   YES  Loose stool/Diarrhea  NO  any skin issues    Patient Risk Factors Reviewed as follows:  NO  have you been in Close contact with confirmed COVID19 patient   NO  History of recent travel to affected geographical areas within the past 14 days  NO  COPD  NO  Active Cancer/Leukemia/Lymphoma/Chemotherapy  NO  Oral steroid use  NO  Pregnant  NO  Diabetes Mellitus  YES  Heart disease  NO  Asthma  NO Health care worker at home  [address] care worker  NO Is there a Pregnant Woman in the home  NO Dialysis pt in the home   NO a large number of people living in the home    Learning Assessment 5/24/2021   PRIMARY LEARNER Patient   HIGHEST LEVEL OF EDUCATION - PRIMARY LEARNER  -   PRIMARY LANGUAGE ENGLISH   LEARNER PREFERENCE PRIMARY READING   ANSWERED BY patient    RELATIONSHIP SELF     Fall Risk Assessment, last 12 mths 7/23/2021   Able to walk?  Yes Fall in past 12 months? 0   Do you feel unsteady? 0   Are you worried about falling 0       3 most recent PHQ Screens 7/23/2021   Little interest or pleasure in doing things Several days   Feeling down, depressed, irritable, or hopeless Several days   Total Score PHQ 2 2     Abuse Screening Questionnaire 7/23/2021   Do you ever feel afraid of your partner? N   Are you in a relationship with someone who physically or mentally threatens you? N   Is it safe for you to go home?  Y       ADL Assessment 7/23/2021   Feeding yourself No Help Needed   Getting from bed to chair No Help Needed   Getting dressed No Help Needed   Bathing or showering No Help Needed   Walk across the room (includes cane/walker) No Help Needed   Using the telphone No Help Needed   Taking your medications No Help Needed   Preparing meals No Help Needed   Managing money (expenses/bills) No Help Needed   Moderately strenuous housework (laundry) No Help Needed   Shopping for personal items (toiletries/medicines) No Help Needed   Shopping for groceries No Help Needed   Driving No Help Needed   Climbing a flight of stairs No Help Needed   Getting to places beyond walking distances No Help Needed

## 2021-07-24 LAB
ALBUMIN SERPL-MCNC: 4.4 G/DL (ref 3.5–5)
ALBUMIN/GLOB SERPL: 1.3 {RATIO} (ref 1.1–2.2)
ALP SERPL-CCNC: 75 U/L (ref 45–117)
ALT SERPL-CCNC: 27 U/L (ref 12–78)
ANION GAP SERPL CALC-SCNC: 5 MMOL/L (ref 5–15)
AST SERPL-CCNC: 16 U/L (ref 15–37)
BASOPHILS # BLD: 0 K/UL (ref 0–0.1)
BASOPHILS NFR BLD: 0 % (ref 0–1)
BILIRUB SERPL-MCNC: 0.4 MG/DL (ref 0.2–1)
BUN SERPL-MCNC: 8 MG/DL (ref 6–20)
BUN/CREAT SERPL: 12 (ref 12–20)
CALCIUM SERPL-MCNC: 9.9 MG/DL (ref 8.5–10.1)
CHLORIDE SERPL-SCNC: 105 MMOL/L (ref 97–108)
CO2 SERPL-SCNC: 30 MMOL/L (ref 21–32)
COMMENT, HOLDF: NORMAL
CREAT SERPL-MCNC: 0.65 MG/DL (ref 0.55–1.02)
DIFFERENTIAL METHOD BLD: NORMAL
EOSINOPHIL # BLD: 0.1 K/UL (ref 0–0.4)
EOSINOPHIL NFR BLD: 1 % (ref 0–7)
ERYTHROCYTE [DISTWIDTH] IN BLOOD BY AUTOMATED COUNT: 12.1 % (ref 11.5–14.5)
ERYTHROCYTE [SEDIMENTATION RATE] IN BLOOD: 16 MM/HR (ref 0–30)
GLOBULIN SER CALC-MCNC: 3.4 G/DL (ref 2–4)
GLUCOSE SERPL-MCNC: 88 MG/DL (ref 65–100)
HCT VFR BLD AUTO: 42.7 % (ref 35–47)
HGB BLD-MCNC: 13.9 G/DL (ref 11.5–16)
IMM GRANULOCYTES # BLD AUTO: 0 K/UL (ref 0–0.04)
IMM GRANULOCYTES NFR BLD AUTO: 0 % (ref 0–0.5)
LYMPHOCYTES # BLD: 3.1 K/UL (ref 0.8–3.5)
LYMPHOCYTES NFR BLD: 32 % (ref 12–49)
MCH RBC QN AUTO: 28.9 PG (ref 26–34)
MCHC RBC AUTO-ENTMCNC: 32.6 G/DL (ref 30–36.5)
MCV RBC AUTO: 88.8 FL (ref 80–99)
MONOCYTES # BLD: 0.8 K/UL (ref 0–1)
MONOCYTES NFR BLD: 8 % (ref 5–13)
NEUTS SEG # BLD: 5.9 K/UL (ref 1.8–8)
NEUTS SEG NFR BLD: 59 % (ref 32–75)
NRBC # BLD: 0 K/UL (ref 0–0.01)
NRBC BLD-RTO: 0 PER 100 WBC
PLATELET # BLD AUTO: 288 K/UL (ref 150–400)
PMV BLD AUTO: 12.3 FL (ref 8.9–12.9)
POTASSIUM SERPL-SCNC: 3.8 MMOL/L (ref 3.5–5.1)
PROT SERPL-MCNC: 7.8 G/DL (ref 6.4–8.2)
RBC # BLD AUTO: 4.81 M/UL (ref 3.8–5.2)
SAMPLES BEING HELD,HOLD: NORMAL
SODIUM SERPL-SCNC: 140 MMOL/L (ref 136–145)
WBC # BLD AUTO: 9.9 K/UL (ref 3.6–11)

## 2021-07-27 LAB
ALBUMIN SERPL ELPH-MCNC: 4.2 G/DL (ref 2.9–4.4)
ALBUMIN/GLOB SERPL: 1.3 {RATIO} (ref 0.7–1.7)
ALPHA1 GLOB SERPL ELPH-MCNC: 0.2 G/DL (ref 0–0.4)
ALPHA2 GLOB SERPL ELPH-MCNC: 0.8 G/DL (ref 0.4–1)
B-GLOBULIN SERPL ELPH-MCNC: 1 G/DL (ref 0.7–1.3)
GAMMA GLOB SERPL ELPH-MCNC: 1.2 G/DL (ref 0.4–1.8)
GLOBULIN SER-MCNC: 3.3 G/DL (ref 2.2–3.9)
IGA SERPL-MCNC: 128 MG/DL (ref 87–352)
IGG SERPL-MCNC: 1093 MG/DL (ref 586–1602)
IGM SERPL-MCNC: 90 MG/DL (ref 26–217)
INTERPRETATION SERPL IEP-IMP: NORMAL
KAPPA LC FREE SER-MCNC: 13.5 MG/L (ref 3.3–19.4)
KAPPA LC FREE/LAMBDA FREE SER: 0.96 {RATIO} (ref 0.26–1.65)
LAMBDA LC FREE SERPL-MCNC: 14 MG/L (ref 5.7–26.3)
M PROTEIN SERPL ELPH-MCNC: NORMAL G/DL
PROT SERPL-MCNC: 7.5 G/DL (ref 6–8.5)

## 2021-08-16 ENCOUNTER — TELEPHONE (OUTPATIENT)
Dept: FAMILY MEDICINE CLINIC | Age: 51
End: 2021-08-16

## 2021-08-16 NOTE — TELEPHONE ENCOUNTER
----- Message from Checo Thomas sent at 8/16/2021  9:29 AM EDT -----  Regarding: Dr. Marcio Slaughter: [phone]  Medication Refill    Caller (if not patient):Pt      Relationship of caller (if not patient):n/a      Best contact number(s):[phone]      Name of medication and dosage if known:amitriptyline (ELAVIL) 50 mg tablet       Is patient out of this medication (yes/no):yes      Pharm[facility] name[facility] listed in chart? (yes/no):yes  Pharmacy phone number: [phone]  Fax:  [phone]       Details to clarify the request:n/a      Checo Thomas

## 2021-10-28 ENCOUNTER — PATIENT MESSAGE (OUTPATIENT)
Dept: FAMILY MEDICINE CLINIC | Age: 51
End: 2021-10-28

## 2021-10-28 DIAGNOSIS — L30.8 OTHER ECZEMA: ICD-10-CM

## 2021-10-28 DIAGNOSIS — M62.830 BACK SPASM: ICD-10-CM

## 2021-10-28 RX ORDER — TIZANIDINE 4 MG/1
TABLET ORAL
Qty: 30 TABLET | Refills: 4 | Status: SHIPPED | OUTPATIENT
Start: 2021-10-28 | End: 2022-06-03

## 2021-10-28 RX ORDER — TRIAMCINOLONE ACETONIDE 1 MG/G
CREAM TOPICAL
Qty: 454 G | Refills: 1 | Status: SHIPPED | OUTPATIENT
Start: 2021-10-28 | End: 2021-10-29

## 2021-10-28 NOTE — TELEPHONE ENCOUNTER
From: Saira Bradford  To: Tri Mariano MD  Sent: 10/28/2021 10:31 AM EDT  Subject: Prescription Question    I am refilling two prescriptions today. Tizanidine for my back and Triamcinolone for my eczema. The Triamcinolone had only 1 refill prior to 4/14/2020. May I PLEASE get this refilled? The eczema on my feet is tremendous. It has been quite helpful during these flair ups. The Tizanidine has 2 refills prior to 7/23/22. No problem, for now. BUT I imagine that it will need to be refilled many more times than that in that time frame. Just saying.   Thank you so very much!!  [facility]

## 2022-01-24 ENCOUNTER — VIRTUAL VISIT (OUTPATIENT)
Dept: FAMILY MEDICINE CLINIC | Age: 52
End: 2022-01-24
Payer: COMMERCIAL

## 2022-01-24 DIAGNOSIS — J01.00 ACUTE NON-RECURRENT MAXILLARY SINUSITIS: Primary | ICD-10-CM

## 2022-01-24 PROCEDURE — 99443 PR PHYS/QHP TELEPHONE EVALUATION 21-30 MIN: CPT | Performed by: INTERNAL MEDICINE

## 2022-01-24 RX ORDER — AZITHROMYCIN 250 MG/1
TABLET, FILM COATED ORAL
Qty: 6 TABLET | Refills: 0 | Status: SHIPPED | OUTPATIENT
Start: 2022-01-24 | End: 2022-06-03

## 2022-01-24 RX ORDER — PREDNISONE 20 MG/1
20 TABLET ORAL
Qty: 5 TABLET | Refills: 0 | Status: SHIPPED | OUTPATIENT
Start: 2022-01-24 | End: 2022-06-03

## 2022-01-24 NOTE — PROGRESS NOTES
Pj Johnson is a 45 y.o. female presenting for/with:    Chief Complaint   Patient presents with    Sinus Infection     C/O sinus pain x 1 week. C/O HA. C/O sinus drainage. feels like it moves with movement of head       There were no vitals taken for this visit. Pain Scale: /10  Pain Location:     1. Have you been to the ER, urgent care clinic since your last visit? Hospitalized since your last visit? NO    2. Have you seen or consulted any other health care providers outside of the [address] [place] [address] since your last visit? Include any pap smears or colon screening. NO    Symptom review:  NO  Fever   NO  Shaking chills  NO  Cough  NO  Body aches  NO  Coughing up blood  NO  Chest congestion  NO  Chest pain  NO  Shortness of breath  NO  Profound Loss of smell/taste  NO  Nausea/Vomiting   NO  Loose stool/Diarrhea  NO  any skin issues    Patient Risk Factors Reviewed as follows:  NO  have you been in Close contact with confirmed COVID19 patient   NO  History of recent travel to affected geographical areas within the past 14 days  NO  COPD  NO  Active Cancer/Leukemia/Lymphoma/Chemotherapy  NO  Oral steroid use  NO  Pregnant  NO  Diabetes Mellitus  NO  Heart disease  NO  Asthma  NO Health care worker at home  NO Health care worker  NO Is there a Pregnant Woman in the home  NO Dialysis pt in the home   NO a large number of people living in the home    Learning Assessment 5/24/2021   PRIMARY LEARNER Patient   HIGHEST LEVEL OF EDUCATION - PRIMARY LEARNER  -   PRIMARY LANGUAGE ENGLISH   LEARNER PREFERENCE PRIMARY READING   ANSWERED BY patient    RELATIONSHIP SELF     Fall Risk Assessment, last 12 mths 1/24/2022   Able to walk? Yes   Fall in past 12 months? 0   Do you feel unsteady?  0   Are you worried about falling 0       3 most recent PHQ Screens 1/24/2022   Little interest or pleasure in doing things Not at all   Feeling down, depressed, irritable, or hopeless Not at all   Total Score PHQ 2 0     Abuse Screening Questionnaire 1/24/2022   Do you ever feel afraid of your partner? N   Are you in a relationship with someone who physically or mentally threatens you? N   Is it safe for you to go home?  Y       ADL Assessment 1/24/2022   Feeding yourself No Help Needed   Getting from bed to chair No Help Needed   Getting dressed No Help Needed   Bathing or showering No Help Needed   Walk across the room (includes cane/walker) No Help Needed   Using the telphone No Help Needed   Taking your medications No Help Needed   Preparing meals No Help Needed   Managing money (expenses/bills) No Help Needed   Moderately strenuous housework (laundry) No Help Needed   Shopping for personal items (toiletries/medicines) No Help Needed   Shopping for groceries No Help Needed   Driving No Help Needed   Climbing a flight of stairs No Help Needed   Getting to places beyond walking distances No Help Needed      Advance Care Planning 6/11/2021   Patient's Healthcare Decision Maker is: Verbal statement (Legal Next of Kin remains as decision maker)   Confirm Advance Directive None   Patient Would Like to Complete Advance Directive No

## 2022-01-24 NOTE — PROGRESS NOTES
Shine Stauffer is a 45 y.o. female evaluated via telephone on 1/24/2022. Consent:  She and/or health care decision maker is aware that that she may receive a bill for this telephone service, depending on her insurance coverage, and has provided verbal consent to proceed: Yes    A/P:  1. Acute non-recurrent maxillary sinusitis  Trial of a azithromycin and prednisone. Red flags discussed with patient. Expect improvement within 72 hours. If not, please arrange an in person evaluation  - azithromycin (ZITHROMAX) 250 mg tablet; Take 2 tablets today, then take 1 tablet daily  Dispense: 6 Tablet; Refill: 0  - predniSONE (DELTASONE) 20 mg tablet; Take 20 mg by mouth daily (with breakfast). Dispense: 5 Tablet; Refill: 0        HPI: 58-year-old woman with recent sinus congestion with drainage. Headache worse when bending over. No fever. Covid negative. Has been boosted. I affirm this is a Patient Initiated Episode with an Established Patient who has not had a related appointment within my department in the past 7 days or scheduled within the next 24 hours. On this date 01/24/2022 I have spent 21 minutes reviewing previous notes, test results and face to face with the patient discussing the diagnosis and importance of compliance with the treatment plan as well as documenting on the day of the visit.      Note: not billable if this call serves to triage the patient into an appointment for the relevant concern      Tommie Boo MD

## 2022-03-19 PROBLEM — M13.0 POLYARTICULAR ARTHRITIS: Status: ACTIVE | Noted: 2019-04-15

## 2022-03-19 PROBLEM — L30.9 ECZEMA: Status: ACTIVE | Noted: 2019-04-15

## 2022-06-03 ENCOUNTER — OFFICE VISIT (OUTPATIENT)
Dept: FAMILY MEDICINE CLINIC | Age: 52
End: 2022-06-03
Payer: COMMERCIAL

## 2022-06-03 VITALS — OXYGEN SATURATION: 92 % | RESPIRATION RATE: 19 BRPM | TEMPERATURE: 97.5 F | HEART RATE: 77 BPM

## 2022-06-03 DIAGNOSIS — R05.9 COUGH: ICD-10-CM

## 2022-06-03 DIAGNOSIS — J06.9 ACUTE UPPER RESPIRATORY INFECTION, UNSPECIFIED: ICD-10-CM

## 2022-06-03 DIAGNOSIS — J02.9 SORE THROAT: Primary | ICD-10-CM

## 2022-06-03 LAB
S PYO AG THROAT QL: NEGATIVE
SARS-COV-2 POC: NEGATIVE
VALID INTERNAL CONTROL?: YES

## 2022-06-03 PROCEDURE — 87426 SARSCOV CORONAVIRUS AG IA: CPT | Performed by: NURSE PRACTITIONER

## 2022-06-03 PROCEDURE — 87880 STREP A ASSAY W/OPTIC: CPT | Performed by: NURSE PRACTITIONER

## 2022-06-03 PROCEDURE — 99213 OFFICE O/P EST LOW 20 MIN: CPT | Performed by: NURSE PRACTITIONER

## 2022-06-03 RX ORDER — PREDNISONE 20 MG/1
20 TABLET ORAL
Qty: 5 TABLET | Refills: 0 | Status: SHIPPED | OUTPATIENT
Start: 2022-06-03 | End: 2022-08-04 | Stop reason: ALTCHOICE

## 2022-06-03 RX ORDER — CODEINE PHOSPHATE AND GUAIFENESIN 10; 100 MG/5ML; MG/5ML
5 SOLUTION ORAL
Qty: 140 ML | Refills: 0 | Status: SHIPPED | OUTPATIENT
Start: 2022-06-03 | End: 2022-06-10

## 2022-06-03 RX ORDER — AZITHROMYCIN 500 MG/1
500 TABLET, FILM COATED ORAL DAILY
Qty: 10 TABLET | Refills: 0 | Status: SHIPPED | OUTPATIENT
Start: 2022-06-03 | End: 2022-06-13

## 2022-06-03 NOTE — PROGRESS NOTES
Chief Complaint   Patient presents with    Sore Throat     pt presents with sore throat, ear \"itchiness\", h/a, runy nose. Tested herself for covid on wednesday and was negative. HPI:      Giuliano Stubbs is a 47 y.o. female. She is a . New Issues:  She has been sick since Tuesday. 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 She is complaining of a severe cough that is effecting her sleep, ear fullness, sore throat and sinus drainage. She did a home test for COVID that was negative on Wednesday. Allergies   Allergen Reactions    Pollo Hives    Pcn [Penicillins] Unknown (comments)    Rocephin [Ceftriaxone] Rash       No current outpatient medications on file. No current facility-administered medications for this visit. Past Medical History:   Diagnosis Date    Asthma     Menopause     MRSA (methicillin resistant Staphylococcus aureus)        No past surgical history on file. Social History     Socioeconomic History    Marital status:     Highest education level: Bachelor's degree (e.g., BA, AB, BS)   Tobacco Use    Smoking status: Never Smoker    Smokeless tobacco: Never Used   Vaping Use    Vaping Use: Never used   Substance and Sexual Activity    Alcohol use: No    Drug use: No    Sexual activity: Yes     Partners: Male     Birth control/protection: None   Other Topics Concern     Service No    Blood Transfusions No    Caffeine Concern No    Occupational Exposure No    Hobby Hazards No    Sleep Concern No    Stress Concern No    Weight Concern No    Special Diet No    Back Care Yes    Exercise Yes    Bike Helmet No    Seat Belt Yes    Self-Exams Yes       Family History   Problem Relation Age of Onset    Diabetes Paternal Grandfather        Above history reviewed. ROS:  Denies fever, chills, POS cough, denies  chest pain, SOB,  nausea, vomiting, or diarrhea.   Denies wt loss, wt gain, hemoptysis, hematochezia or melena. Physical Examination:    Pulse 77   Temp 97.5 °F (36.4 °C) (Temporal)   Resp 19   SpO2 92%     General: Alert and Ox3, Fluent speech, appears ill  HEENT:  PERRLA, EOM intact, TMs bulging bilaterally, turbinates normal, pharynx erythematous. No thyromegaly. No cervical adenopathy. Neck:  Supple, no adenopathy, JVD, mass or bruit  Chest:  Clear to Ausculation, without wheezes, rales, rubs or ronchi  Cardiac: RRR  Extremities:  No cyanosis, clubbing or edema  Neurologic:  Ambulatory without assist, CN 2-12 grossly intact. Moves all extremities. Skin: no rash  Lymphadenopathy: no cervical or supraclavicular nodes    Results for orders placed or performed in visit on 06/03/22   AMB POC RAPID STREP A   Result Value Ref Range    VALID INTERNAL CONTROL POC Yes     Group A Strep Ag Negative Negative   AMB POC SARS-COV-2   Result Value Ref Range    SARS-COV-2 POC Negative Negative      ASSESSMENT AND PLAN:     1. Sore throat  Negative strep  Negative Flu  - AMB POC RAPID STREP A  - AMB POC SARS-COV-2    2. Acute upper respiratory infection, unspecified  Reviewed self care measures:  Fluids  Nasal Saline  Humidification + menthol petroleum (Vicks.)  Postural drainage  NSAID of choice PRN  Avoid decongestants, too drying and difficult to clear respiratory secretions. - predniSONE (DELTASONE) 20 mg tablet; Take 1 Tablet by mouth daily (with breakfast). Dispense: 5 Tablet; Refill: 0  - azithromycin (ZITHROMAX) 500 mg tab; Take 1 Tablet by mouth daily for 10 days. Dispense: 10 Tablet; Refill: 0    3. Cough  Do not drive after taking   - guaiFENesin-codeine (ROBITUSSIN AC) 100-10 mg/5 mL solution; Take 5 mL by mouth four (4) times daily as needed for Cough for up to 7 days. Max Daily Amount: 20 mL.   Dispense: 140 mL; Refill: 0     RTC PRN: The patient was advised to return to (or contact) the clinic or go to the ER for any ALARM sx such as temp > 101.5, worsening cough, sputum production, confusion or shortness of breath.     Alicia Finn, NP

## 2022-06-03 NOTE — PROGRESS NOTES
Seth Sandoval is a 47 y.o. female presenting for/with:    Chief Complaint   Patient presents with    Sore Throat     pt presents with sore throat, ear \"itchiness\", h/a, runy nose. Tested herself for covid on wednesday and was negative. Visit Vitals  Pulse 77   Temp 97.5 °F (36.4 °C) (Temporal)   Resp 19   SpO2 92%         3 most recent PHQ Screens 6/3/2022   Little interest or pleasure in doing things Not at all   Feeling down, depressed, irritable, or hopeless Not at all   Total Score PHQ 2 0     Learning Assessment 6/3/2022   PRIMARY LEARNER Patient   HIGHEST LEVEL OF EDUCATION - PRIMARY LEARNER  -   PRIMARY LANGUAGE ENGLISH   LEARNER PREFERENCE PRIMARY READING   ANSWERED BY pt   RELATIONSHIP SELF     Fall Risk Assessment, last 12 mths 6/3/2022   Able to walk? Yes   Fall in past 12 months? 1   Do you feel unsteady? 0   Are you worried about falling 0   Is TUG test greater than 12 seconds? 0   Is the gait abnormal? 0   Number of falls in past 12 months 1   Fall with injury? 1     Abuse Screening Questionnaire 6/3/2022   Do you ever feel afraid of your partner? N   Are you in a relationship with someone who physically or mentally threatens you? N   Is it safe for you to go home? Y     ADL Assessment 6/3/2022   Feeding yourself No Help Needed   Getting from bed to chair No Help Needed   Getting dressed No Help Needed   Bathing or showering No Help Needed   Walk across the room (includes cane/walker) No Help Needed   Using the telphone No Help Needed   Taking your medications No Help Needed   Preparing meals No Help Needed   Managing money (expenses/bills) No Help Needed   Moderately strenuous housework (laundry) No Help Needed   Shopping for personal items (toiletries/medicines) No Help Needed   Shopping for groceries No Help Needed   Driving No Help Needed   Climbing a flight of stairs No Help Needed   Getting to places beyond walking distances No Help Needed       1.  \"Have you been to the ER, urgent care clinic since your last visit? Hospitalized since your last visit? \" No    2. \"Have you seen or consulted any other health care providers outside of the [address] [place] [address] since your last visit? \" No     3. For patients aged 39-70: Has the patient had a colonoscopy / FIT/ Cologuard? No      If the patient is female:    4. For patients aged 41-77: Has the patient had a mammogram within the past 2 years? No      5. For patients aged 21-65: Has the patient had a pap smear? No          Symptom review:    NO  Fever   NO  Shaking chills  YES  Cough  NO  Body aches  NO  Coughing up blood  YES  Chest congestion  NO  Chest pain  NO  Shortness of breath  NO  Profound Loss of smell/taste  NO  Nausea/Vomiting   NO  Loose stool/Diarrhea  NO  any skin issues    Patient Risk Factors Reviewed as follows:  NO  have you been in Close contact with confirmed COVID19 patient   NO  History of recent travel to affected geographical areas within the past 14 days  NO  COPD  NO  Active Cancer/Leukemia/Lymphoma/Chemotherapy  NO  Oral steroid use  NO  Pregnant  NO  Diabetes Mellitus  NO  Heart disease  NO  Asthma  NO Health care worker at home  NO Health care worker  NO Is there a Pregnant Woman in the home  NO Dialysis pt in the home   NO a large number of people living in the home  Recent Travel Screening and Travel History documentation     Travel Screening     Question   Response    In the last 10 days, have you been in contact with someone who was confirmed or suspected to have Coronavirus/COVID-19? No / Unsure    Have you had a COVID-19 viral test in the last 10 days? No    Do you have any of the following new or worsening symptoms? Severe headache;Weakness;Cough; Sore throat; Fatigue;Runny nose    Have you traveled internationally or domestically in the last month?   No      Travel History   Travel since 05/03/22    No documented travel since 05/03/22               Advance Care Planning 6/3/2022   Patient's Healthcare Decision Maker is: Legal Next of Kin   Confirm Advance Directive None   Patient Would Like to Complete Advance Directive No      Results for orders placed or performed in visit on 06/03/22   AMB POC RAPID STREP A   Result Value Ref Range    VALID INTERNAL CONTROL POC Yes     Group A Strep Ag Negative Negative   AMB POC SARS-COV-2   Result Value Ref Range    SARS-COV-2 POC Negative Negative

## 2022-06-06 ENCOUNTER — TELEPHONE (OUTPATIENT)
Dept: FAMILY MEDICINE CLINIC | Age: 52
End: 2022-06-06

## 2022-08-04 ENCOUNTER — OFFICE VISIT (OUTPATIENT)
Dept: FAMILY MEDICINE CLINIC | Age: 52
End: 2022-08-04
Payer: COMMERCIAL

## 2022-08-04 VITALS
SYSTOLIC BLOOD PRESSURE: 120 MMHG | RESPIRATION RATE: 18 BRPM | OXYGEN SATURATION: 97 % | HEART RATE: 101 BPM | TEMPERATURE: 98.2 F | DIASTOLIC BLOOD PRESSURE: 74 MMHG

## 2022-08-04 DIAGNOSIS — R21 RASH: Primary | ICD-10-CM

## 2022-08-04 PROCEDURE — 99213 OFFICE O/P EST LOW 20 MIN: CPT | Performed by: INTERNAL MEDICINE

## 2022-08-04 RX ORDER — DOXYCYCLINE 100 MG/1
100 CAPSULE ORAL 2 TIMES DAILY
Qty: 20 CAPSULE | Refills: 0 | Status: SHIPPED | OUTPATIENT
Start: 2022-08-04 | End: 2022-08-14

## 2022-08-04 NOTE — PROGRESS NOTES
Lucy Corona is a 47 y.o. female presenting for/with:    Chief Complaint   Patient presents with    Rash     Patient states she noticed a spot on her upper L arm on Monday and since then it has spread and is now very itchy and all over        Visit Vitals  /74 (BP 1 Location: Right arm, BP Patient Position: Sitting)   Pulse (!) 101   Temp 98.2 °F (36.8 °C) (Temporal)   Resp 18   SpO2 97%     Pain Scale: 0 - No pain/10  Pain Location:     1. \"Have you been to the ER, urgent care clinic since your last visit? Hospitalized since your last visit? \" No    2. \"Have you seen or consulted any other health care providers outside of the [address] [place] [address] since your last visit? \" No     3. For patients aged 39-70: Has the patient had a colonoscopy / FIT/ Cologuard? Yes - Care Gap present. Most recent result on file      If the patient is female:    4. For patients aged 41-77: Has the patient had a mammogram within the past 2 years? Yes - Care Gap present. Most recent result on file      5. For patients aged 21-65: Has the patient had a pap smear? Yes - Care Gap present.  Most recent result on file      Symptom review:  NO  Fever   NO  Shaking chills  NO  Cough  NO  Body aches  NO  Coughing up blood  NO  Chest congestion  NO  Chest pain  NO  Shortness of breath  NO  Profound Loss of smell/taste  NO  Nausea/Vomiting   NO  Loose stool/Diarrhea  NO  any skin issues    Patient Risk Factors Reviewed as follows:  NO  have you been in Close contact with confirmed COVID19 patient   NO  History of recent travel to affected geographical areas within the past 14 days  NO  COPD  NO  Active Cancer/Leukemia/Lymphoma/Chemotherapy  NO  Oral steroid use  NO  Pregnant  NO  Diabetes Mellitus  NO  Heart disease  NO  Asthma  NO Health care worker at home  NO Health care worker  NO Is there a Pregnant Woman in the home  NO Dialysis pt in the home   NO a large number of people living in the home    Learning Assessment 6/3/2022 PRIMARY LEARNER Patient   HIGHEST LEVEL OF EDUCATION - PRIMARY LEARNER  -   PRIMARY LANGUAGE ENGLISH   LEARNER PREFERENCE PRIMARY READING   ANSWERED BY pt   RELATIONSHIP SELF     Fall Risk Assessment, last 12 mths 6/3/2022   Able to walk? Yes   Fall in past 12 months? 1   Do you feel unsteady? 0   Are you worried about falling 0   Is TUG test greater than 12 seconds? 0   Is the gait abnormal? 0   Number of falls in past 12 months 1   Fall with injury? 1       3 most recent PHQ Screens 6/3/2022   Little interest or pleasure in doing things Not at all   Feeling down, depressed, irritable, or hopeless Not at all   Total Score PHQ 2 0     Abuse Screening Questionnaire 6/3/2022   Do you ever feel afraid of your partner? N   Are you in a relationship with someone who physically or mentally threatens you? N   Is it safe for you to go home?  Y       ADL Assessment 6/3/2022   Feeding yourself No Help Needed   Getting from bed to chair No Help Needed   Getting dressed No Help Needed   Bathing or showering No Help Needed   Walk across the room (includes cane/walker) No Help Needed   Using the telphone No Help Needed   Taking your medications No Help Needed   Preparing meals No Help Needed   Managing money (expenses/bills) No Help Needed   Moderately strenuous housework (laundry) No Help Needed   Shopping for personal items (toiletries/medicines) No Help Needed   Shopping for groceries No Help Needed   Driving No Help Needed   Climbing a flight of stairs No Help Needed   Getting to places beyond walking distances No Help Needed      Advance Care Planning 6/3/2022   Patient's Healthcare Decision Maker is: Legal Next of Kin   Confirm Advance Directive None   Patient Would Like to Complete Advance Directive No

## 2022-08-09 ENCOUNTER — TELEPHONE (OUTPATIENT)
Dept: FAMILY MEDICINE CLINIC | Age: 52
End: 2022-08-09

## 2022-08-09 NOTE — TELEPHONE ENCOUNTER
Patient reports S/S started when she originally took doxy without food and caused nausea. Suggested taking medication with food. Also take a COVID test. If test is positive then to continue doxy for tickborne illnesses. If test is negative contact the office for other options R/T nausea, vomiting and diarrhea from possible ABX.

## 2022-08-09 NOTE — TELEPHONE ENCOUNTER
Pt requesting a call back in regards to having side effects from being put on doxycycline.   Pt stated that she is having the following symptoms:    Upset stomach   Vomiting  Diarrhea  Headache  Fatigue   Joint pain

## 2022-11-25 ENCOUNTER — OFFICE VISIT (OUTPATIENT)
Dept: FAMILY MEDICINE CLINIC | Age: 52
End: 2022-11-25
Payer: COMMERCIAL

## 2022-11-25 VITALS
HEIGHT: 68 IN | TEMPERATURE: 97.5 F | RESPIRATION RATE: 18 BRPM | HEART RATE: 76 BPM | BODY MASS INDEX: 28.07 KG/M2 | OXYGEN SATURATION: 100 % | DIASTOLIC BLOOD PRESSURE: 70 MMHG | SYSTOLIC BLOOD PRESSURE: 120 MMHG

## 2022-11-25 DIAGNOSIS — R39.15 URGENCY OF URINATION: Primary | ICD-10-CM

## 2022-11-25 LAB
BILIRUB UR QL STRIP: NEGATIVE
GLUCOSE UR-MCNC: NEGATIVE MG/DL
KETONES P FAST UR STRIP-MCNC: NEGATIVE MG/DL
PH UR STRIP: 7.5 [PH] (ref 4.6–8)
PROT UR QL STRIP: NEGATIVE
SP GR UR STRIP: 1.01 (ref 1–1.03)
UA UROBILINOGEN AMB POC: NORMAL (ref 0.2–1)
URINALYSIS CLARITY POC: NORMAL
URINALYSIS COLOR POC: YELLOW
URINE BLOOD POC: NORMAL
URINE LEUKOCYTES POC: NEGATIVE
URINE NITRITES POC: NEGATIVE

## 2022-11-25 PROCEDURE — 81003 URINALYSIS AUTO W/O SCOPE: CPT | Performed by: INTERNAL MEDICINE

## 2022-11-25 PROCEDURE — 99213 OFFICE O/P EST LOW 20 MIN: CPT | Performed by: INTERNAL MEDICINE

## 2022-11-25 RX ORDER — NITROFURANTOIN 25; 75 MG/1; MG/1
100 CAPSULE ORAL 2 TIMES DAILY
Qty: 10 CAPSULE | Refills: 0 | Status: SHIPPED | OUTPATIENT
Start: 2022-11-25 | End: 2022-11-30

## 2022-11-25 NOTE — PROGRESS NOTES
Chief Complaint   Patient presents with    UTI     C/O lower back pain. (+) urgency (+) frequency. Did take x1 dosage of zithromax yesterday. States S/S are improved today       ASSESSMENT AND PLAN:    1. Urgency of urination  Most likely UTI. Remote chance of nephrolithiasis. Expect improvement within 36 hours or sooner with Macrobid and if symptoms worsen go to the ER to be worked up for renal stone. - AMB POC URINALYSIS DIP STICK AUTO W/O MICRO  - CULTURE, URINE; Future  - CULTURE, URINE  - nitrofurantoin, macrocrystal-monohydrate, (Macrobid) 100 mg capsule; Take 1 Capsule by mouth two (2) times a day for 5 days. Dispense: 10 Capsule; Refill: 0      Orders Placed This Encounter    CULTURE, URINE     Standing Status:   Future     Number of Occurrences:   1     Standing Expiration Date:   11/25/2023    AMB POC URINALYSIS DIP STICK AUTO W/O MICRO    nitrofurantoin, macrocrystal-monohydrate, (Macrobid) 100 mg capsule     Sig: Take 1 Capsule by mouth two (2) times a day for 5 days. Dispense:  10 Capsule     Refill:  0       Amy Fajardo MD, FACP      HPI:        Darrin Cedeno is a 47 y.o. female who presented to clinic today with symptoms of lower back pain, frequent urination and whose urinalysis was positive for blood. No prior history of kidney stone. She took a single dose of azithromycin and did feel somewhat better. Describes an allergy to penicillin and Rocephin. Allergies   Allergen Reactions    Cashew Nut Hives    Imperial Hives    Papaya Hives    Pcn [Penicillins] Unknown (comments)    Rocephin [Ceftriaxone] Rash       Current Outpatient Medications   Medication Sig    nitrofurantoin, macrocrystal-monohydrate, (Macrobid) 100 mg capsule Take 1 Capsule by mouth two (2) times a day for 5 days. No current facility-administered medications for this visit.        Past Medical History:   Diagnosis Date    Asthma     Menopause     MRSA (methicillin resistant Staphylococcus aureus)          ROS: Denies fever, chills, cough, chest pain, SOB,  nausea, vomiting, or diarrhea. Denies wt loss, wt gain, hemoptysis, hematochezia or melena. Physical Examination:    Visit Vitals  /70   Pulse 76   Temp 97.5 °F (36.4 °C) (Temporal)   Resp 18   Ht 5' 8\" (1.727 m)   SpO2 100%   BMI 28.07 kg/m²      General:  Alert, cooperative, no distress. Head:  Normocephalic, without obvious abnormality, atraumatic. Eyes:  Conjunctivae/corneas clear. Pupils equal, round, reactive to light. Chest wall:  No tenderness or deformity. Extremities: Extremities normal, atraumatic, no cyanosis or edema.    Skin:  No rash   Lymph nodes: Cervical and supraclavicular nodes are normal.   Neurologic: Moves all extremities, fluent speech

## 2022-11-27 LAB
BACTERIA SPEC CULT: ABNORMAL
CC UR VC: ABNORMAL
SERVICE CMNT-IMP: ABNORMAL

## 2023-02-17 ENCOUNTER — OFFICE VISIT (OUTPATIENT)
Dept: FAMILY MEDICINE CLINIC | Age: 53
End: 2023-02-17
Payer: COMMERCIAL

## 2023-02-17 VITALS
SYSTOLIC BLOOD PRESSURE: 118 MMHG | HEART RATE: 94 BPM | OXYGEN SATURATION: 97 % | WEIGHT: 182.8 LBS | DIASTOLIC BLOOD PRESSURE: 66 MMHG | RESPIRATION RATE: 18 BRPM | TEMPERATURE: 97.5 F | HEIGHT: 68 IN | BODY MASS INDEX: 27.71 KG/M2

## 2023-02-17 DIAGNOSIS — M62.830 BACK SPASM: Primary | ICD-10-CM

## 2023-02-17 DIAGNOSIS — M54.6 CHRONIC MIDLINE THORACIC BACK PAIN: ICD-10-CM

## 2023-02-17 DIAGNOSIS — G89.29 CHRONIC MIDLINE THORACIC BACK PAIN: ICD-10-CM

## 2023-02-17 PROBLEM — M79.7 FIBROMYALGIA AFFECTING MULTIPLE SITES: Status: ACTIVE | Noted: 2023-02-17

## 2023-02-17 PROCEDURE — 99214 OFFICE O/P EST MOD 30 MIN: CPT | Performed by: INTERNAL MEDICINE

## 2023-02-17 RX ORDER — PREDNISONE 20 MG/1
20 TABLET ORAL
Qty: 10 TABLET | Refills: 0 | Status: SHIPPED | OUTPATIENT
Start: 2023-02-17

## 2023-02-17 RX ORDER — TIZANIDINE 4 MG/1
TABLET ORAL
Qty: 30 TABLET | Refills: 4 | Status: SHIPPED | OUTPATIENT
Start: 2023-02-17

## 2023-02-17 NOTE — PROGRESS NOTES
Chief Complaint   Patient presents with    Medication Evaluation    Back Pain     Mid back pain (around bra strap)            ASSESSMENT AND PLAN:    1. Back spasm  She has well-established degenerative disc disease. The pain is disabling and preventing her from living an active lifestyle to the point where she is unable to travel it is impacting her job as well. Short trial of prednisone 20 mg a day for 10 days to see if we can cool down the inflammation and have her functioning better. Imaging to include a thoracic MRI to follow-up on the prior spine films and referral to rheumatology and neurosurgery. Muscle relaxant refilled. Consider adding amitriptyline at the end of the prednisone. Follow-up with me in 6 weeks or sooner if needed and reconsider physical therapy. No orders of the defined types were placed in this encounter. Bishop Norman MD, FACP      HPI:         is a 47 y.o. female who has a history of fibromyalgia previously treated with amitriptyline and muscle relaxants. Arrives today with worsening midthoracic pain. Please refer to films above. This is impacting her ability to work and recreate. Her sleep is poor. She has tried swimming, stretching, yoga, biking and is actually lost 35 pounds and the pain is still debilitating. She has not seen rheumatology or neurosurgery and she has not had MRI imaging. Her plain films were reviewed with her today. These show extensive erosion of disc spaces especially in the thoracic spine. She has tried lidocaine patches which have not helped. Allergies   Allergen Reactions    Cashew Nut Hives    Pollo Hives    Papaya Hives    Pcn [Penicillins] Unknown (comments)    Rocephin [Ceftriaxone] Rash       No current outpatient medications on file. No current facility-administered medications for this visit.        Past Medical History:   Diagnosis Date    Asthma     Menopause     MRSA (methicillin resistant Staphylococcus aureus)          ROS:  Denies fever, chills, cough, chest pain, SOB,  nausea, vomiting, or diarrhea. Denies wt loss, wt gain, hemoptysis, hematochezia or melena. Physical Examination:    Visit Vitals  /66   Pulse 94   Temp 97.5 °F (36.4 °C) (Temporal)   Resp 18   Ht 5' 8\" (1.727 m)   Wt 182 lb 12.8 oz (82.9 kg)   SpO2 97%   BMI 27.79 kg/m²      General:  Alert, cooperative, no distress. Head:  Normocephalic, without obvious abnormality, atraumatic. Eyes:  Conjunctivae/corneas clear. Pupils equal, round, reactive to light. Chest: No wheezes, rales. Rubs or ronchi   Cardiac: RRR.  No peripheral edema     Skin: No rash     Duration 30 minutes primarily counseling

## 2023-02-17 NOTE — PROGRESS NOTES
Clay Terrazas is a 47 y.o. female presenting for/with:    Chief Complaint   Patient presents with    Medication Evaluation    Back Pain     Mid back pain (around bra strap)        There were no vitals taken for this visit. Pain Scale: /10  Pain Location:     1. \"Have you been to the ER, urgent care clinic since your last visit? Hospitalized since your last visit? \" No    2. \"Have you seen or consulted any other health care providers outside of the [address] [place] [address] [facility] since your last visit? \" No     3. For patients aged 39-70: Has the patient had a colonoscopy / FIT/ Cologuard? NO      If the patient is female:    4. For patients aged 41-77: Has the patient had a mammogram within the past 2 years? No      5. For patients aged 21-65: Has the patient had a pap smear? Yes - no Care Gap present      Symptom review:  Learning Assessment 6/3/2022   PRIMARY LEARNER Patient   HIGHEST LEVEL OF EDUCATION - PRIMARY LEARNER  -   PRIMARY LANGUAGE ENGLISH   LEARNER PREFERENCE PRIMARY READING   ANSWERED BY pt   RELATIONSHIP SELF     Fall Risk Assessment, last 12 mths 2/17/2023   Able to walk? Yes   Fall in past 12 months? 1   Do you feel unsteady? 0   Are you worried about falling 0   Is TUG test greater than 12 seconds? -   Is the gait abnormal? 0   Number of falls in past 12 months 2   Fall with injury? 0       3 most recent PHQ Screens 2/17/2023   Little interest or pleasure in doing things Not at all   Feeling down, depressed, irritable, or hopeless Not at all   Total Score PHQ 2 0     Abuse Screening Questionnaire 2/17/2023   Do you ever feel afraid of your partner? N   Are you in a relationship with someone who physically or mentally threatens you? N   Is it safe for you to go home?  Y       ADL Assessment 2/17/2023   Feeding yourself No Help Needed   Getting from bed to chair No Help Needed   Getting dressed No Help Needed   Bathing or showering No Help Needed   Walk across the room (includes cane/walker) No Help Needed   Using the telphone No Help Needed   Taking your medications No Help Needed   Preparing meals No Help Needed   Managing money (expenses/bills) No Help Needed   Moderately strenuous housework (laundry) No Help Needed   Shopping for personal items (toiletries/medicines) No Help Needed   Shopping for groceries No Help Needed   Driving No Help Needed   Climbing a flight of stairs No Help Needed   Getting to places beyond walking distances No Help Needed      Advance Care Planning 2/17/2023   Patient's Healthcare Decision Maker is: Legal Next of Kin   Confirm Advance Directive None   Patient Would Like to Complete Advance Directive No

## 2023-02-26 ENCOUNTER — HOSPITAL ENCOUNTER (OUTPATIENT)
Dept: MRI IMAGING | Age: 53
Discharge: HOME | End: 2023-02-26
Attending: INTERNAL MEDICINE
Payer: COMMERCIAL

## 2023-02-26 DIAGNOSIS — M54.6 CHRONIC MIDLINE THORACIC BACK PAIN: ICD-10-CM

## 2023-02-26 DIAGNOSIS — G89.29 CHRONIC MIDLINE THORACIC BACK PAIN: ICD-10-CM

## 2023-02-26 PROCEDURE — 72146 MRI CHEST SPINE W/O DYE: CPT

## 2024-04-06 NOTE — PROGRESS NOTES
1. Pain in thoracic spine  She was advised to taper off the pregabalin.  I have refilled her Zanaflex and I am okay with her taking her diclofenac but we discussed many other modalities which could provide some help including a TENS unit, deep tissue massage, lidocaine patches, swimming and other exercises.  She would benefit from seeing neurosurgery to see if there is any surgical alternatives to alleviate her pain.  We discussed how opioid analgesics would not be a good approach.  - Amb External Referral To Physical Therapy  - Amb External Referral To Neurosurgery  - tiZANidine (ZANAFLEX) 4 MG tablet; 1 BID prn back spasm  Dispense: 30 tablet; Refill: 3    2. Pain management  It sounds like her pain management provider has exhausted all of her options and she has referred her back here for further management.         Chief Complaint   Patient presents with    Back Pain     States is seeing pain management. Currently taking yrica and diclofenac         Orders Placed This Encounter   Procedures    Amb External Referral To Physical Therapy     Referral Priority:   Routine     Referral Type:   Eval and Treat     Referral Reason:   Specialty Services Required     Requested Specialty:   Physical Therapist     Number of Visits Requested:   1    Amb External Referral To Neurosurgery     Referral Priority:   Routine     Referral Type:   Eval and Treat     Referral Reason:   Specialty Services Required     Referred to Provider:   Ender Anderson MD     Requested Specialty:   Neurosurgery     Number of Visits Requested:   1       Nacho Brar MD, FACP      HPI:         is a 54 y.o. female who arrives for thoracic and lower back pain which has been present for many years.  She had an MRI of her thoracic spine in the summer 2023 which demonstrated lower thoracic disc disease.  Her pain she describes at times is intolerable and debilitating.  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    She is

## 2024-04-08 ENCOUNTER — OFFICE VISIT (OUTPATIENT)
Age: 54
End: 2024-04-08
Payer: COMMERCIAL

## 2024-04-08 VITALS
RESPIRATION RATE: 18 BRPM | HEART RATE: 103 BPM | WEIGHT: 186.2 LBS | BODY MASS INDEX: 28.22 KG/M2 | OXYGEN SATURATION: 98 % | SYSTOLIC BLOOD PRESSURE: 111 MMHG | DIASTOLIC BLOOD PRESSURE: 87 MMHG | HEIGHT: 68 IN

## 2024-04-08 DIAGNOSIS — R52 PAIN MANAGEMENT: ICD-10-CM

## 2024-04-08 DIAGNOSIS — M54.6 PAIN IN THORACIC SPINE: Primary | ICD-10-CM

## 2024-04-08 PROCEDURE — 99214 OFFICE O/P EST MOD 30 MIN: CPT | Performed by: INTERNAL MEDICINE

## 2024-04-08 RX ORDER — PREGABALIN 100 MG/1
100 CAPSULE ORAL 3 TIMES DAILY
COMMUNITY

## 2024-04-08 RX ORDER — TIZANIDINE 4 MG/1
TABLET ORAL
Qty: 30 TABLET | Refills: 3 | Status: SHIPPED | OUTPATIENT
Start: 2024-04-08

## 2024-04-08 RX ORDER — DICLOFENAC SODIUM 75 MG/1
75 TABLET, DELAYED RELEASE ORAL 2 TIMES DAILY
COMMUNITY
Start: 2023-09-05

## 2024-04-08 ASSESSMENT — PATIENT HEALTH QUESTIONNAIRE - PHQ9
2. FEELING DOWN, DEPRESSED OR HOPELESS: NOT AT ALL
SUM OF ALL RESPONSES TO PHQ9 QUESTIONS 1 & 2: 0
SUM OF ALL RESPONSES TO PHQ QUESTIONS 1-9: 0
SUM OF ALL RESPONSES TO PHQ QUESTIONS 1-9: 0
1. LITTLE INTEREST OR PLEASURE IN DOING THINGS: NOT AT ALL
SUM OF ALL RESPONSES TO PHQ QUESTIONS 1-9: 0
SUM OF ALL RESPONSES TO PHQ QUESTIONS 1-9: 0

## 2024-04-08 NOTE — PROGRESS NOTES
Lisa Escobar is a 54 y.o. female presenting for/with:    Chief Complaint   Patient presents with    Back Pain     States is seeing pain management. Currently taking yrica and diclofenac       Vitals:    04/08/24 1538   BP: 111/87   Site: Left Upper Arm   Position: Sitting   Cuff Size: Medium Adult   Pulse: (!) 103   Resp: 18   SpO2: 98%   Weight: 84.5 kg (186 lb 3.2 oz)   Height: 1.727 m (5' 8\")       Pain Scale: 6/10  Pain Location: Back    \"Have you been to the ER, urgent care clinic since your last visit?  Hospitalized since your last visit?\"    NO    “Have you seen or consulted any other health care providers outside of [facility] since your last visit?”    NO    “Have you had a colorectal cancer screening such as a colonoscopy/FIT/Cologuard?    NO       Have you had a mammogram?”   NO    Date of last Mammogram: 8/13/2020 4/8/2024     3:34 PM   PHQ-9    Little interest or pleasure in doing things 0   Feeling down, depressed, or hopeless 0   PHQ-2 Score 0   PHQ-9 Total Score 0           6/3/2022    12:00 AM 5/24/2021    12:00 AM   [facility] AMB LEARNING ASSESSMENT   Primary Learner Patient Patient   Primary Language ENGLISH ENGLISH   Learning Preference READING READING   Answered By pt patient   Relationship to Learner SELF SELF            2/17/2023     9:00 AM   Amb Fall Risk Assessment and TUG Test   Fall in past 12 months? 1   Able to walk? Yes           4/8/2024     3:00 PM   ADL ASSESSMENT   Feeding yourself No Help Needed   Getting from bed to chair No Help Needed   Getting dressed No Help Needed   Bathing or showering No Help Needed   Walk across the room (includes cane/walker) No Help Needed   Using the telphone No Help Needed   Taking your medications No Help Needed   Preparing meals No Help Needed   Managing money (expenses/bills) No Help Needed   Moderately strenuous housework (laundry) No Help Needed   Shopping for personal items (toiletries/medicines) No Help Needed

## 2024-04-12 NOTE — PROGRESS NOTES
"  ----------------------------------------------------------------------------------------------------------  [facility]  History and Physical    Smita Flores MRN# [id number]  Age: 15 year old YOB: 2008  Date of Admission: 4/5/2024  Legal Status: Voluntary     Impression:     Smita Flores is a 16 yo female with previous psychiatric diagnoses of ASD, schizoaffective disorder, and TESSA who was admitted to [facility] with hallucinations, suicidal ideation and out of control behaviors in the context of chronic hallucinations and transitioning at school. Significant symptoms include suicidal ideation with plan without intent, auditory, visual and tactile hallucinations. Most recent hospitalization was in 6/2021 for recurrent psychotic episodes. She has completed several PHPs. Most recent was at [facility] in 10/2023.      Chart review of previous hospitalization indicated first psychotic episode began in 5/2021 when she was 11 years old. At that time, mom described the episode as \"look of fear\" This progressed to her looking left and right sporadically. Then, Smita would tilt her head back and start wailing, crying, and screaming. This prompted first admission related to \"episodes\". It was noted that psychotic episodes and aggression appeared to be triggered by boredom as observed during that hospital stay.     Past medication trial includes Risperidone at 2 yo for acting out behaviors. Abilify and Seroquel worked initially then stopped working. Tried several stimulants that made her depressed. Fluoxetine and Sertraline caused uriel resulting in restraint. Depakote caused LFTs elevation. Clonidine and guanfacine caused loss of bladder control. Clozapine caused myocarditis. Lamictal worked briefly then stopped. Mom finds Olanzapine and Asenapine most helpful currently. Also tried several PRNs including thorazine, olanzapine, and haloperidol.     Current psychosocial " Pt presents to the flu clinic for covid testing. 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 Pt reports mild sx and possible exposure. Pt declined to see a doctor today.  KT stressors include school transitioning, chronic mental health issues.  Patient's support system includes family, outpatient team, and school.  Substance use does not appear to be playing a contributing role in the patient's presentation.       Biological predisposing factors include sensory processing deficits, delivery complication, developmental delay (speech, motor), genetic loading of mood disorder. Social predisposing factors include parental separation. These led to psychologically anxious temperament, low self-esteem, and attachment issue. Biological precipitating factors include adverse effects from multiple medication trials. Social precipitating factors include school transitioning, peer issues, possible enmeshment with mom. These further exacerbated maladaptive coping and suicidal thoughts.      The MSE is notable for anxious affect, fair eye contact, ruminative thought process, normal rate of speech, suicidal ideation with plan without intent, auditory, tactile, and visual hallucinations, not appear to be responding to internal stimuli. Negative for speech latency, word-finding difficulties, blunt/ flat affect. Noted patient made frequent requests for olanzapine and overall very knowledgeable about her medications.      Her reported symptoms of auditory, visual and tactile hallucinations resulting in anxiety and suicidal ideation and mom reported symptoms of unresponsiveness, mumbling, poor memory after episodes together with our observations of medication seeking behavior, above average knowledge about medications raise concerns for autism spectrum disorder complicated by maladaptive coping skills. Although children with ASD are at elevated risk of psychosis, Smita exhibits hallucinations without other positive symptoms of psychosis and does not exhibit negative symptoms of psychosis making primary psychotic disorder or historical diagnosis of schizoaffective disorder unlikely. She has traits of  anxiety and panic attacks that may have contributed or may be etiologies of these episodes. Positive reinforcement likely contributes to medication-seeking and hospitalization seeking behaviors. Reasons for enabling maladaptive coping mechanisms by mother can possibly be explained by mother's extreme worries about Smita's health given the episodes are terrifying to her. Ongoing assessment is required for definitive diagnosis. Will continue home medications and increase Olanzapine to 15mg at night to target hallucinations and anxiety related to hallucinations. First lithium level was obtained earlier than 12 hour trough so repeated lithium level was ordered and revealed level of 1.25. Left voicemail with call back number for Dr. Benitez, her outpatient psychiatrist to coordinate care. Behavioral interventions and evaluation of adequate outpatient / in-home supports will be targets for this admission.      Given that she currently has suicidal ideation and out of control behavior, patient warrants inpatient psychiatric hospitalization to maintain her safety. Disposition pending clinical stabilization, behavioral intervention and development of an appropriate discharge plan.     Diagnoses and Plan:     [facility]  Attending Provider: Rupa Sol MD     Psychiatric Diagnoses:   -Psychosis, unspecified   -ASD by history   -r/o panic attacks  -Explore parent child relations     Today's changes:   -No medication changes  -Medication history pharmacy consult     Medications (psychotropic):   The risks, benefits, alternatives, and side effects have been discussed and are understood by the patient and other caregivers (mother).  - PTA Olanzapine 10mg daily   - PTA Olanzapine 10mg nightly increased to 15mg nightly   - PTA  Asenapine 10mg BID   - PTA Lithium 1,800mg at bedtime     Hospital PRNs as ordered:  Current Facility-Administered Medications   Medication Dose Route Frequency Provider Last Rate Last Admin     "acetaminophen (TYLENOL) tablet 650 mg  650 mg Oral Q4H PRN Jacquie Polo APRN CNP        hydrOXYzine HCl (ATARAX) tablet 25 mg  25 mg Oral TID PRN Marielena Arriaga MD        lidocaine (LMX4) cream   Topical Once PRN Jacquie Polo APRN CNP        melatonin tablet 3 mg  3 mg Oral At Bedtime PRN Jacquie Polo APRN CNP           Laboratory/Imaging/Test Results:  For results obtained during current hospitalization, please see below.    Consults:  - Did NOT request substance use assessment or Rule 25 due to NO concern about substance use  - Family Assessment completed and reviewed.  - Pharmacy consult       Other Interventions:   - BCBA for behavioral planning and management     - Patient treated in therapeutic milieu with appropriate individual and group therapies as indicated and as able.  - Collateral information, ROIs, legal documentation, prior testing results, and other pertinent information requested within 24 hours of admission.  - Dr. Sol spoke with outpatient psychiatrist Dr. Benitez   - Neuropsych reported obtained from [facility]     Medical diagnoses to be addressed this admission:   Chart review indicated that seizure workup was done for these \"episodes\". Per Dr. Aguirre on 6/10/2021, \"The description of these events in conjunction with the EEG continues to sound most consistent with episodes of behavioral agitation that are most likely secondary to her known psychiatric comorbidities.\"     Legal Status: Voluntary    Safety Assessment:   Checks: Status 15  Additional Precautions: self-injury, assault, suicide   Patient has not required locked seclusion or restraints in the past 24 hours to maintain safety.  Please refer to RN documentation for further details.    Anticipated Disposition:  Discharge date: TBD   Target disposition: TBD     ---------------------------------------------     This patient was seen and discussed with my attending physician, Rupa Sol MD.     Marielena Arriaga MD   PGY-2 " "Psychiatry Resident     Attestation:  I evaluated the patient with the resident/fellow on 4/11/2024 and agree with the resident/fellow's findings and plan, with additions and changes as noted below.      Vital signs, laboratory testing, and medications reviewed.    Total time was 50 minutes spent on the date of 4/11/2024 the encounter doing chart review, history and exam, documentation  coordination of care,  further activities as noted above and discharge planning.    Rupa Sol M.D, [facility]  Child, Adolescent and Adult Psychiatry and Addiction Medicine        Interim History:     The patient's care was discussed with the treatment team and chart notes were reviewed.      Side effects to medication: denies  Sleep: sleep 6.5 hrs, used bathroom x1. Requested water.   Intake: eating/drinking without difficulty  Groups: appropriately participating and attending groups  Interactions & function:   seems to report hallucinations when needs are not met     Per nursing report, they stated they were seeing ghosts and hearing them say carson.    Per clinical treatment coordinator, plan for family meeting Monday     Chief Complaint: \"I have episodes and am suicidal all the time\"     Smita was on the phone with her mom this afternoon and raised her voice asking \"what do you want\" at me. I attempted to have her follow me to see her twice but she wanted to finish her project. She was seen in interview room. Asked when will we give her more medications. Reported they medication is not helping anything and she has episodes all day until she goes to bed. We pointed out fun activities in group and he said he liked them. Asked again if we can give her haldol. We discussed about not changing any medications today. She sobbed and said we don't care about her having episodes and she feels like other coping skills don't help. We redirected her and did relaxed breathing together. We encouraged her to use more relaxed breathing " throughout the day and she nodded.     The 10 point Review of Systems is negative other than noted above.       Medications:     SCHEDULED:  Current Facility-Administered Medications   Medication Dose Route Frequency Provider Last Rate Last Admin    asenapine (SAPHRIS) sublingual tablet 10 mg  10 mg Sublingual BID Jacquie Polo APRN CNP   10 mg at 04/11/24 2008    clindamycin (CLEOCIN T) 1 % lotion   Topical Daily Lila Conroy APRN CNP   Given at 04/11/24 0946    desmopressin (DDAVP) tablet 0.6 mg  0.6 mg Oral At Bedtime Jacquie Polo APRN CNP   0.6 mg at 04/11/24 2008    lithium (ESKALITH) capsule 1,800 mg  1,800 mg Oral At Bedtime Jacquie Polo APRN CNP   1,800 mg at 04/11/24 2008    metFORMIN (GLUCOPHAGE) tablet 1,000 mg  1,000 mg Oral Daily with supper Jacquie Polo APRN CNP   1,000 mg at 04/11/24 1753    metFORMIN (GLUCOPHAGE) tablet 500 mg  500 mg Oral BID w/meals Jacquie Polo APRN CNP   500 mg at 04/11/24 1214    multivitamin w/minerals (THERA-VIT-M) tablet 1 tablet  1 tablet Oral Daily Jacquie Polo APRN CNP   1 tablet at 04/11/24 0945    OLANZapine (zyPREXA) tablet 10 mg  10 mg Oral Daily Marielena Arriaga MD   10 mg at 04/11/24 0945    OLANZapine (zyPREXA) tablet 15 mg  15 mg Oral At Bedtime Marielena Arriaga MD   15 mg at 04/11/24 2008    tolterodine ER (DETROL LA) 24 hr capsule 4 mg  4 mg Oral Daily Jacquie Polo APRN CNP   4 mg at 04/11/24 0945    tretinoin (RETIN-A) 0.05 % cream   Topical At Bedtime Lila Conroy APRN CNP   Given at 04/11/24 2047       PRN:  Current Facility-Administered Medications   Medication Dose Route Frequency Provider Last Rate Last Admin    acetaminophen (TYLENOL) tablet 650 mg  650 mg Oral Q4H PRN Jacquie Polo APRN CNP        hydrOXYzine HCl (ATARAX) tablet 25 mg  25 mg Oral TID PRN Marielena Arriaga MD        lidocaine (LMX4) cream   Topical Once PRN Jacquie Polo, WENDY GIBSON        melatonin tablet 3 mg  3 mg Oral At Bedtime PRN Jacquie Polo, APRN  "CNP              Allergies:     Allergies   Allergen Reactions    Benzoyl Peroxide Rash     Mild reaction - sensitive skin on face to some products including Clearasil    Soap Hives     Also body soap/  OK to do self care hand scrubs          Psychiatric Mental Status Examination:     BP 97/51   Pulse 93   Temp 99.6  F (37.6  C) (Temporal)   Resp 16   Ht 1.6 m (5' 3\")   Wt 96.3 kg (212 lb 4 oz)   LMP 04/04/2024   SpO2 98%   BMI 37.60 kg/m      MENTAL STATUS EXAMINATION  Appearance: Appears younger than stated age   Behavior/Demeanor/Attitude:  initially frustrated, brightened up and calm with redirection and distraction   Alertness: Awake and alert   Eye Contact: downcast    Mood: \"can you give me haldol?   Affect: tearful   Speech:  normal rate and tone, soft volume, no speech latency  Language: fluent in english   Psychomotor Behavior: no tremors, no catatonia   Thought Process: ruminative on medications, catastrophic   Associations: questionable   Thought Content: passive death wish, reported auditory, visual and tactile hallucinations; doesn't appear to be responding to internal stimuli   Insight: poor   Judgment: poor   Oriented to: grossly oriented   Attention Span and Concentration: intact to conversation   Recent and Remote Memory: needs several reminder that we increased olanzapine and will not be making medication change at this time   Fund of Knowledge: estimated to be average   Muscle Strength and Tone: normal    Gait and Station: normal     C-SSRS (Daily/Shift Screen)     Q2 Suicidal Thoughts (Since Last Contact):  yes   Q3 Have you been thinking about how you might do this:  no  Q4 Suicidal Intent without Specific Plan:  no  Q5 Suicide Intent with Specific Plan:  no  Q6 Suicide Behavior:  no  Level of Risk per Screen:  no risk indicated   Change in Protective Factors? Safe environment with supervision    Environmental Risk Factors:       Laboratory Studies:     Labs have been personally " reviewed.    Results for orders placed or performed during the hospital encounter of 04/05/24   TSH with free T4 reflex and/or T3 as indicated     Status: Abnormal   Result Value Ref Range    TSH 4.68 (H) 0.50 - 4.30 uIU/mL   Lithium level     Status: Abnormal   Result Value Ref Range    Lithium 1.34 (H) 0.60 - 1.20 mmol/L   Lipid panel     Status: Abnormal   Result Value Ref Range    Cholesterol 164 <170 mg/dL    Triglycerides 188 (H) <=90 mg/dL    Direct Measure HDL 35 (L) >=45 mg/dL    LDL Cholesterol Calculated 91 <=110 mg/dL    Non HDL Cholesterol 129 (H) <120 mg/dL    Narrative    Cholesterol  Desirable:  <170 mg/dL  Borderline High:  170-199 mg/dl  High:  >199 mg/dl    Triglycerides  Normal:  Less than 90 mg/dL  Borderline High:   mg/dL  High:  Greater than or equal to 130 mg/dL    Direct Measure HDL  Greater than or equal to 45 mg/dL   Low: Less than 40 mg/dL   Borderline Low: 40-44 mg/dL    LDL Cholesterol  Desirable: 0-110 mg/dL   Borderline High: 110-129 mg/dL   High: >= 130 mg/dL    Non HDL Cholesterol  Desirable:  Less than 120 mg/dL  Borderline High:  120-144 mg/dL  High:  Greater than or equal to 145 mg/dL   Hemoglobin A1c     Status: Normal   Result Value Ref Range    Hemoglobin A1C 4.9 <5.7 %   Glucose - Fasting     Status: Normal   Result Value Ref Range    Glucose 99 70 - 99 mg/dL   Ferritin     Status: Normal   Result Value Ref Range    Ferritin 9 8 - 115 ng/mL   Comprehensive metabolic panel     Status: Abnormal   Result Value Ref Range    Sodium 132 (L) 135 - 145 mmol/L    Potassium 4.0 3.4 - 5.3 mmol/L    Carbon Dioxide (CO2) 21 (L) 22 - 29 mmol/L    Anion Gap 9 7 - 15 mmol/L    Urea Nitrogen 13.8 5.0 - 18.0 mg/dL    Creatinine 0.65 0.51 - 0.95 mg/dL    GFR Estimate      Calcium 9.3 8.4 - 10.2 mg/dL    Chloride 102 98 - 107 mmol/L    Glucose 99 70 - 99 mg/dL    Alkaline Phosphatase 134 40 - 150 U/L    AST 11 0 - 35 U/L    ALT 45 0 - 50 U/L    Protein Total 6.5 6.3 - 7.8 g/dL    Albumin  4.0 3.2 - 4.5 g/dL    Bilirubin Total <0.2 <=1.0 mg/dL   CBC with platelets and differential     Status: Abnormal   Result Value Ref Range    WBC Count 11.1 (H) 4.0 - 11.0 10e3/uL    RBC Count 4.32 3.70 - 5.30 10e6/uL    Hemoglobin 11.4 (L) 11.7 - 15.7 g/dL    Hematocrit 35.5 35.0 - 47.0 %    MCV 82 77 - 100 fL    MCH 26.4 (L) 26.5 - 33.0 pg    MCHC 32.1 31.5 - 36.5 g/dL    RDW 14.4 10.0 - 15.0 %    Platelet Count 372 150 - 450 10e3/uL    % Neutrophils 61 %    % Lymphocytes 26 %    % Monocytes 8 %    % Eosinophils 4 %    % Basophils 1 %    % Immature Granulocytes 0 %    NRBCs per 100 WBC 0 <1 /100    Absolute Neutrophils 6.7 1.3 - 7.0 10e3/uL    Absolute Lymphocytes 2.9 1.0 - 5.8 10e3/uL    Absolute Monocytes 0.9 0.0 - 1.3 10e3/uL    Absolute Eosinophils 0.4 0.0 - 0.7 10e3/uL    Absolute Basophils 0.1 0.0 - 0.2 10e3/uL    Absolute Immature Granulocytes 0.0 <=0.4 10e3/uL    Absolute NRBCs 0.0 10e3/uL   T4 free     Status: Normal   Result Value Ref Range    Free T4 1.30 1.00 - 1.60 ng/dL   Lithium level     Status: Abnormal   Result Value Ref Range    Lithium 1.25 (H) 0.60 - 1.20 mmol/L   Extra Tube     Status: None    Narrative    The following orders were created for panel order Extra Tube.  Procedure                               Abnormality         Status                     ---------                               -----------         ------                     Extra Green Top (Lithium...[[id number]]                      Final result                 Please view results for these tests on the individual orders.   Extra Green Top (Lithium Heparin) Tube     Status: None   Result Value Ref Range    Hold Specimen JIC    UA with Microscopic reflex to Culture     Status: Abnormal    Specimen: Urine, NOS   Result Value Ref Range    Color Urine Straw Colorless, Straw, Light Yellow, Yellow    Appearance Urine Clear Clear    Glucose Urine Negative Negative mg/dL    Bilirubin Urine Negative Negative    Ketones Urine Negative  Negative mg/dL    Specific Gravity Urine 1.003 1.003 - 1.035    Blood Urine Negative Negative    pH Urine 6.5 5.0 - 7.0    Protein Albumin Urine Negative Negative mg/dL    Urobilinogen Urine Normal Normal, 2.0 mg/dL    Nitrite Urine Negative Negative    Leukocyte Esterase Urine Negative Negative    Bacteria Urine Few (A) None Seen /HPF    RBC Urine 1 <=2 /HPF    WBC Urine 0 <=5 /HPF    Squamous Epithelials Urine <1 <=1 /HPF    Narrative    Urine Culture not indicated   Lithium level     Status: Normal   Result Value Ref Range    Lithium 0.95 0.60 - 1.20 mmol/L   CBC with platelets differential *Canceled*     Status: None ()    Narrative    The following orders were created for panel order CBC with platelets differential.  Procedure                               Abnormality         Status                     ---------                               -----------         ------                       Please view results for these tests on the individual orders.   CBC with platelets differential     Status: Abnormal    Narrative    The following orders were created for panel order CBC with platelets differential.  Procedure                               Abnormality         Status                     ---------                               -----------         ------                     CBC with platelets and d...[[id number]]  Abnormal            Final result                 Please view results for these tests on the individual orders.

## 2024-06-20 ENCOUNTER — HOSPITAL ENCOUNTER (OUTPATIENT)
Facility: HOSPITAL | Age: 54
Discharge: HOME | End: 2024-06-20
Attending: NEUROLOGICAL SURGERY
Payer: COMMERCIAL

## 2024-06-20 ENCOUNTER — HOSPITAL ENCOUNTER (OUTPATIENT)
Facility: HOSPITAL | Age: 54
End: 2024-06-20
Attending: NEUROLOGICAL SURGERY
Payer: COMMERCIAL

## 2024-06-20 DIAGNOSIS — M54.50 LOW BACK PAIN, UNSPECIFIED BACK PAIN LATERALITY, UNSPECIFIED CHRONICITY, UNSPECIFIED WHETHER SCIATICA PRESENT: ICD-10-CM

## 2024-06-20 DIAGNOSIS — M54.6 MIDLINE THORACIC BACK PAIN, UNSPECIFIED CHRONICITY: ICD-10-CM

## 2024-06-20 PROCEDURE — 72148 MRI LUMBAR SPINE W/O DYE: CPT

## 2024-06-20 PROCEDURE — 72146 MRI CHEST SPINE W/O DYE: CPT

## 2024-06-24 ENCOUNTER — E-VISIT (OUTPATIENT)
Age: 54
End: 2024-06-24
Payer: COMMERCIAL

## 2024-06-24 ENCOUNTER — OFFICE VISIT (OUTPATIENT)
Age: 54
End: 2024-06-24
Payer: COMMERCIAL

## 2024-06-24 VITALS — OXYGEN SATURATION: 95 % | HEART RATE: 74 BPM | RESPIRATION RATE: 18 BRPM | TEMPERATURE: 99.5 F

## 2024-06-24 DIAGNOSIS — R09.81 CONGESTION OF NASAL SINUS: Primary | ICD-10-CM

## 2024-06-24 LAB
EXP DATE SOLUTION: NORMAL
EXP DATE SWAB: NORMAL
EXPIRATION DATE: NORMAL
LOT NUMBER POC: NORMAL
LOT NUMBER SOLUTION: NORMAL
LOT NUMBER SWAB: NORMAL
SARS-COV-2 RNA, POC: NEGATIVE

## 2024-06-24 PROCEDURE — 87635 SARS-COV-2 COVID-19 AMP PRB: CPT | Performed by: INTERNAL MEDICINE

## 2024-06-24 PROCEDURE — 99213 OFFICE O/P EST LOW 20 MIN: CPT | Performed by: INTERNAL MEDICINE

## 2024-06-24 PROCEDURE — NBSRV NON-BILLABLE SERVICE: Performed by: INTERNAL MEDICINE

## 2024-06-24 RX ORDER — PREDNISONE 20 MG/1
20 TABLET ORAL DAILY
Qty: 5 TABLET | Refills: 0 | Status: SHIPPED | OUTPATIENT
Start: 2024-06-24 | End: 2024-06-29

## 2024-06-24 RX ORDER — AZITHROMYCIN 250 MG/1
250 TABLET, FILM COATED ORAL SEE ADMIN INSTRUCTIONS
Qty: 6 TABLET | Refills: 0 | Status: SHIPPED | OUTPATIENT
Start: 2024-06-24 | End: 2024-06-29

## 2024-06-24 SDOH — ECONOMIC STABILITY: FOOD INSECURITY: WITHIN THE PAST 12 MONTHS, THE FOOD YOU BOUGHT JUST DIDN'T LAST AND YOU DIDN'T HAVE MONEY TO GET MORE.: NEVER TRUE

## 2024-06-24 SDOH — ECONOMIC STABILITY: HOUSING INSECURITY
IN THE LAST 12 MONTHS, WAS THERE A TIME WHEN YOU DID NOT HAVE A STEADY PLACE TO SLEEP OR SLEPT IN A SHELTER (INCLUDING NOW)?: NO

## 2024-06-24 SDOH — ECONOMIC STABILITY: FOOD INSECURITY: WITHIN THE PAST 12 MONTHS, YOU WORRIED THAT YOUR FOOD WOULD RUN OUT BEFORE YOU GOT MONEY TO BUY MORE.: NEVER TRUE

## 2024-06-24 SDOH — ECONOMIC STABILITY: INCOME INSECURITY: HOW HARD IS IT FOR YOU TO PAY FOR THE VERY BASICS LIKE FOOD, HOUSING, MEDICAL CARE, AND HEATING?: NOT HARD AT ALL

## 2024-06-24 ASSESSMENT — PATIENT HEALTH QUESTIONNAIRE - PHQ9
SUM OF ALL RESPONSES TO PHQ QUESTIONS 1-9: 0
1. LITTLE INTEREST OR PLEASURE IN DOING THINGS: NOT AT ALL
2. FEELING DOWN, DEPRESSED OR HOPELESS: NOT AT ALL
SUM OF ALL RESPONSES TO PHQ QUESTIONS 1-9: 0
SUM OF ALL RESPONSES TO PHQ9 QUESTIONS 1 & 2: 0
SUM OF ALL RESPONSES TO PHQ QUESTIONS 1-9: 0
SUM OF ALL RESPONSES TO PHQ QUESTIONS 1-9: 0

## 2024-06-24 NOTE — PROGRESS NOTES
1. Congestion of nasal sinus  Sx are compatible with sinusitis.  Expect gradual improvement this week and if worse, RTC for re evaluation.  - AMB POC COVID-19 COV  - azithromycin (ZITHROMAX) 250 MG tablet; Take 1 tablet by mouth See Admin Instructions for 5 days 500mg on day 1 followed by 250mg on days 2 - 5  Dispense: 6 tablet; Refill: 0  - predniSONE (DELTASONE) 20 MG tablet; Take 1 tablet by mouth daily for 5 days  Dispense: 5 tablet; Refill: 0       Orders Placed This Encounter   Procedures    AMB POC COVID-19 COV     Order Specific Question:   Pregnant?     Answer:   No     Order Specific Question:   Previously tested for COVID-19?     Answer:   Yes       No follow-up provider specified.     Chief Complaint   Patient presents with    Congestion     Large amount bloody mucous Saturday.     Headache     Started Friday. Has body aches, denied fever/chills.        HPI    Lisa Escobar is a 54 y.o. female who complains of bloody mucus and facial pain, worse when bending over since Friday.  No fever.  Recently had prolonged time in the MRI at [facility] last week for back pain.     ROS:    Denies nausea, vomiting, diarrhea, weight loss, weight gain, hemoptysis, hematochezia or melena.  Denies rash, frequency, or dysuria.       Current Outpatient Medications   Medication Sig Dispense Refill    azithromycin (ZITHROMAX) 250 MG tablet Take 1 tablet by mouth See Admin Instructions for 5 days 500mg on day 1 followed by 250mg on days 2 - 5 6 tablet 0    predniSONE (DELTASONE) 20 MG tablet Take 1 tablet by mouth daily for 5 days 5 tablet 0    tiZANidine (ZANAFLEX) 4 MG tablet 1 BID prn back spasm 30 tablet 3     No current facility-administered medications for this visit.           EXAM:   The patient appears tired and mildly ill  Pulse 74   Temp 99.5 °F (37.5 °C) (Temporal)   Resp 18   SpO2 95%   HEENT:  NC/AT PERRLA, EOMI, oropharynx is clear, oral mucosa   Neck: supple, without JVD, thyromegaly, mass or bruit  Lungs:     Consent: The patient's consent was obtained including but not limited to risks of crusting, scabbing, blistering, scarring, darker or lighter pigmentary change, recurrence, incomplete removal and infection. Include Z78.9 (Other Specified Conditions Influencing Health Status) As An Associated Diagnosis?: Yes Duration Of Freeze Thaw-Cycle (Seconds): 10 Post-Care Instructions: I reviewed with the patient in detail post-care instructions. Patient is to wear sunprotection, and avoid picking at any of the treated lesions. Pt may apply Vaseline to crusted or scabbing areas. Number Of Freeze-Thaw Cycles: 2 freeze-thaw cycles Spray Paint Technique: No Medical Necessity Clause: This procedure was medically necessary because the lesions that were treated were: irritated Spray Paint Text: The liquid nitrogen was applied to the skin utilizing a spray paint frosting technique. Detail Level: Zone Medical Necessity Information: It is in your best interest to select a reason for this procedure from the list below. All of these items fulfill various CMS LCD requirements except the new and changing color options.

## 2024-06-24 NOTE — PROGRESS NOTES
Lisa Escobar is a 54 y.o. female presenting for/with:    Chief Complaint   Patient presents with    Congestion     Large amount bloody mucous Saturday.     Headache     Started Friday. Has body aches, denied fever/chills.        Vitals:    06/24/24 1300   Pulse: 74   Resp: 18   Temp: 99.5 °F (37.5 °C)   TempSrc: Temporal   SpO2: 95%       Pain Scale: /10  Pain Location:     \"Have you been to the ER, urgent care clinic since your last visit?  Hospitalized since your last visit?\"    NO    “Have you seen or consulted any other health care providers outside of [facility] since your last visit?”    NO    “Have you had a colorectal cancer screening such as a colonoscopy/FIT/Cologuard?    NO    No colonoscopy on file  No cologuard on file  No FIT/FOBT on file   No flexible sigmoidoscopy on file        Have you had a mammogram?”   NO    Date of last Mammogram: 8/13/2020      “Have you had a pap smear?”    NO    Date of last Cervical Cancer screen (HPV or PAP): 5/13/2019 6/24/2024     1:36 PM   PHQ-9    Little interest or pleasure in doing things 0   Feeling down, depressed, or hopeless 0   PHQ-2 Score 0   PHQ-9 Total Score 0           6/3/2022    12:00 AM 5/24/2021    12:00 AM   [facility] AMB LEARNING ASSESSMENT   Primary Learner Patient Patient   Primary Language ENGLISH ENGLISH   Learning Preference READING READING   Answered By pt patient   Relationship to Learner SELF SELF            6/24/2024     1:35 PM   Amb Fall Risk Assessment and TUG Test   Do you feel unsteady or are you worried about falling?  no   2 or more falls in past year? no   Fall with injury in past year? no           6/24/2024     1:00 PM 4/8/2024     3:00 PM   ADL ASSESSMENT   Feeding yourself No Help Needed No Help Needed   Getting from bed to chair No Help Needed No Help Needed   Getting dressed No Help Needed No Help Needed   Bathing or showering No Help Needed No Help Needed   Walk across the room (includes

## 2024-07-05 ENCOUNTER — TELEPHONE (OUTPATIENT)
Age: 54
End: 2024-07-05

## 2024-07-05 NOTE — TELEPHONE ENCOUNTER
Mailbox full, trying to schedule a np appt (cervical myelopathy) upon receiving an external referral from [facility]

## 2024-07-16 ENCOUNTER — TELEPHONE (OUTPATIENT)
Age: 54
End: 2024-07-16

## 2024-07-16 NOTE — TELEPHONE ENCOUNTER
Mail box still full, trying to schedule a np upon receiving an external referral from [facility]

## 2025-04-15 ENCOUNTER — OFFICE VISIT (OUTPATIENT)
Age: 55
End: 2025-04-15
Payer: COMMERCIAL

## 2025-04-15 VITALS — RESPIRATION RATE: 16 BRPM | HEART RATE: 86 BPM | TEMPERATURE: 97.4 F | OXYGEN SATURATION: 94 %

## 2025-04-15 DIAGNOSIS — R50.9 FEVER, UNSPECIFIED FEVER CAUSE: Primary | ICD-10-CM

## 2025-04-15 DIAGNOSIS — J01.90 ACUTE NON-RECURRENT SINUSITIS, UNSPECIFIED LOCATION: ICD-10-CM

## 2025-04-15 LAB
INFLUENZA A ANTIGEN, POC: NEGATIVE
INFLUENZA B ANTIGEN, POC: NEGATIVE
VALID INTERNAL CONTROL, POC: NORMAL

## 2025-04-15 PROCEDURE — 99213 OFFICE O/P EST LOW 20 MIN: CPT | Performed by: NURSE PRACTITIONER

## 2025-04-15 PROCEDURE — 87502 INFLUENZA DNA AMP PROBE: CPT | Performed by: NURSE PRACTITIONER

## 2025-04-15 RX ORDER — AZITHROMYCIN 250 MG/1
TABLET, FILM COATED ORAL
Qty: 6 TABLET | Refills: 0 | Status: SHIPPED | OUTPATIENT
Start: 2025-04-15 | End: 2025-04-25

## 2025-04-15 SDOH — ECONOMIC STABILITY: FOOD INSECURITY: WITHIN THE PAST 12 MONTHS, YOU WORRIED THAT YOUR FOOD WOULD RUN OUT BEFORE YOU GOT MONEY TO BUY MORE.: NEVER TRUE

## 2025-04-15 SDOH — ECONOMIC STABILITY: TRANSPORTATION INSECURITY
IN THE PAST 12 MONTHS, HAS THE LACK OF TRANSPORTATION KEPT YOU FROM MEDICAL APPOINTMENTS OR FROM GETTING MEDICATIONS?: NO

## 2025-04-15 SDOH — ECONOMIC STABILITY: FOOD INSECURITY: WITHIN THE PAST 12 MONTHS, THE FOOD YOU BOUGHT JUST DIDN'T LAST AND YOU DIDN'T HAVE MONEY TO GET MORE.: NEVER TRUE

## 2025-04-15 SDOH — ECONOMIC STABILITY: TRANSPORTATION INSECURITY
IN THE PAST 12 MONTHS, HAS LACK OF TRANSPORTATION KEPT YOU FROM MEETINGS, WORK, OR FROM GETTING THINGS NEEDED FOR DAILY LIVING?: NO

## 2025-04-15 SDOH — ECONOMIC STABILITY: INCOME INSECURITY: IN THE LAST 12 MONTHS, WAS THERE A TIME WHEN YOU WERE NOT ABLE TO PAY THE MORTGAGE OR RENT ON TIME?: NO

## 2025-04-15 ASSESSMENT — PATIENT HEALTH QUESTIONNAIRE - PHQ9
SUM OF ALL RESPONSES TO PHQ QUESTIONS 1-9: 0
SUM OF ALL RESPONSES TO PHQ QUESTIONS 1-9: 0
1. LITTLE INTEREST OR PLEASURE IN DOING THINGS: NOT AT ALL
2. FEELING DOWN, DEPRESSED OR HOPELESS: NOT AT ALL
SUM OF ALL RESPONSES TO PHQ QUESTIONS 1-9: 0
SUM OF ALL RESPONSES TO PHQ QUESTIONS 1-9: 0

## 2025-04-15 NOTE — PROGRESS NOTES
Chief Complaint   Patient presents with    Fever     Fevers off and on     Ear Pain     Bilateral ears ringing and fullness     Nasal Congestion    Facial Pain     States on Saturday night she started with head pressure ... Using Tylenol, Aspirin and Advil prn          HPI:       is a 55 y.o. female.  She is a . Dr. Brar is her PCP.     New Issues:  She is here for a sick visit.  She reports she felt \"off\" at dinner Saturday night.  Full symptoms started Sunday.  Reports ear ringing and fullness.  Sensitivity to sound.  Intermittent fevers.  Nasal congestion and sinus pressure.  Blowing yellow drainage from her nise.  Alternating Aspirin, Tylenol and Advil at home.  Reports Mucinex makes her \"mean\".     Allergies   Allergen Reactions    Cashew Nut (Anacardium Occidentale) Skin Test Hives    Cashew Nut Oil Hives    Mangifera Indica Hives    Papaya Enzymes Hives    Ceftriaxone Rash    Penicillins Nausea And Vomiting     Other reaction(s): Unknown (comments)       Current Outpatient Medications   Medication Sig Dispense Refill    tiZANidine (ZANAFLEX) 4 MG tablet 1 BID prn back spasm 30 tablet 3     No current facility-administered medications for this visit.        Past Medical History:   Diagnosis Date    Asthma     Chronic back pain approximately 2020    Fibromyalgia approximately 2018    Rx    Menopause     MRSA (methicillin resistant Staphylococcus aureus)     Osteoarthritis approximately 2020       History reviewed. No pertinent surgical history.    Social History     Socioeconomic History    Marital status:      Spouse name: None    Number of children: None    Years of education: None    Highest education level: None   Tobacco Use    Smoking status: Never    Smokeless tobacco: Never   Substance and Sexual Activity    Alcohol use: No    Drug use: No    Sexual activity: Yes     Partners: Male     Birth control/protection: None     Comment: , monogamous     Social

## 2025-04-15 NOTE — PROGRESS NOTES
Lisa Escobar is a 55 y.o. female presenting for/with:    Chief Complaint   Patient presents with    Fever     Fevers off and on     Ear Pain     Bilateral ears ringing and fullness     Nasal Congestion    Facial Pain     States on Saturday night she started with head pressure ... Using Tylenol, Aspirin and Advil prn        Vitals:    04/15/25 0811   Pulse: 86   Resp: 16   Temp: 97.4 °F (36.3 °C)   TempSrc: Temporal   SpO2: 94%       Pain Scale: 0 - No pain/10  Pain Location:     \"Have you been to the ER, urgent care clinic since your last visit?  Hospitalized since your last visit?\"    NO    “Have you seen or consulted any other health care providers outside of [facility] since your last visit?”    NO    “Have you had a colorectal cancer screening such as a colonoscopy/FIT/Cologuard?    NO    No colonoscopy on file  No cologuard on file  No FIT/FOBT on file   No flexible sigmoidoscopy on file        Have you had a mammogram?”   NO    Date of last Mammogram: 8/13/2020      “Have you had a pap smear?”    NO    Date of last Cervical Cancer screen (HPV or PAP): 5/13/2019               4/15/2025     8:10 AM   PHQ-9    Little interest or pleasure in doing things 0   Feeling down, depressed, or hopeless 0   PHQ-2 Score 0   PHQ-9 Total Score 0           6/3/2022    12:00 AM 5/24/2021    12:00 AM   [facility] AMB LEARNING ASSESSMENT   Primary Learner Patient Patient   Primary Language ENGLISH ENGLISH   Learning Preference READING READING   Answered By pt patient   Relationship to Learner SELF SELF            6/24/2024     1:35 PM   Amb Fall Risk Assessment and TUG Test   Do you feel unsteady or are you worried about falling?  no   2 or more falls in past year? no   Fall with injury in past year? no           6/24/2024     1:00 PM 4/8/2024     3:00 PM   ADL ASSESSMENT   Feeding yourself No Help Needed No Help Needed   Getting from bed to chair No Help Needed No Help Needed   Getting dressed No Help Needed No